# Patient Record
Sex: MALE | NOT HISPANIC OR LATINO | Employment: FULL TIME | URBAN - METROPOLITAN AREA
[De-identification: names, ages, dates, MRNs, and addresses within clinical notes are randomized per-mention and may not be internally consistent; named-entity substitution may affect disease eponyms.]

---

## 2017-04-25 ENCOUNTER — COMMUNICATION - HEALTHEAST (OUTPATIENT)
Dept: FAMILY MEDICINE | Facility: CLINIC | Age: 57
End: 2017-04-25

## 2017-04-25 DIAGNOSIS — I10 HTN (HYPERTENSION): ICD-10-CM

## 2017-04-25 DIAGNOSIS — E78.5 HYPERLIPIDEMIA: ICD-10-CM

## 2017-05-19 ENCOUNTER — OFFICE VISIT - HEALTHEAST (OUTPATIENT)
Dept: FAMILY MEDICINE | Facility: CLINIC | Age: 57
End: 2017-05-19

## 2017-05-19 DIAGNOSIS — E78.5 HYPERLIPIDEMIA: ICD-10-CM

## 2017-05-19 DIAGNOSIS — Z00.00 HEALTH CARE MAINTENANCE: ICD-10-CM

## 2017-05-19 DIAGNOSIS — I10 HYPERTENSION: ICD-10-CM

## 2017-05-19 DIAGNOSIS — E66.9 NON MORBID OBESITY, UNSPECIFIED OBESITY TYPE: ICD-10-CM

## 2017-05-19 LAB
CHOLEST SERPL-MCNC: 181 MG/DL
FASTING STATUS PATIENT QL REPORTED: YES
HDLC SERPL-MCNC: 42 MG/DL
LDLC SERPL CALC-MCNC: 108 MG/DL
PSA SERPL-MCNC: 1.7 NG/ML (ref 0–3.5)
TRIGL SERPL-MCNC: 153 MG/DL

## 2017-05-26 ENCOUNTER — COMMUNICATION - HEALTHEAST (OUTPATIENT)
Dept: FAMILY MEDICINE | Facility: CLINIC | Age: 57
End: 2017-05-26

## 2017-07-22 ENCOUNTER — COMMUNICATION - HEALTHEAST (OUTPATIENT)
Dept: FAMILY MEDICINE | Facility: CLINIC | Age: 57
End: 2017-07-22

## 2017-07-22 DIAGNOSIS — E78.5 HYPERLIPIDEMIA: ICD-10-CM

## 2017-07-22 DIAGNOSIS — I10 HTN (HYPERTENSION): ICD-10-CM

## 2017-07-25 ENCOUNTER — AMBULATORY - HEALTHEAST (OUTPATIENT)
Dept: FAMILY MEDICINE | Facility: CLINIC | Age: 57
End: 2017-07-25

## 2017-07-25 ENCOUNTER — AMBULATORY - HEALTHEAST (OUTPATIENT)
Dept: LAB | Facility: CLINIC | Age: 57
End: 2017-07-25

## 2017-07-25 DIAGNOSIS — Z00.00 HEALTH CARE MAINTENANCE: ICD-10-CM

## 2017-07-26 LAB — PSA SERPL-MCNC: 1.9 NG/ML (ref 0–3.5)

## 2017-07-28 ENCOUNTER — COMMUNICATION - HEALTHEAST (OUTPATIENT)
Dept: FAMILY MEDICINE | Facility: CLINIC | Age: 57
End: 2017-07-28

## 2017-07-28 ENCOUNTER — AMBULATORY - HEALTHEAST (OUTPATIENT)
Dept: FAMILY MEDICINE | Facility: CLINIC | Age: 57
End: 2017-07-28

## 2017-07-28 DIAGNOSIS — R97.20 ELEVATED PSA: ICD-10-CM

## 2017-08-10 ENCOUNTER — RECORDS - HEALTHEAST (OUTPATIENT)
Dept: ADMINISTRATIVE | Facility: OTHER | Age: 57
End: 2017-08-10

## 2018-01-18 ENCOUNTER — COMMUNICATION - HEALTHEAST (OUTPATIENT)
Dept: FAMILY MEDICINE | Facility: CLINIC | Age: 58
End: 2018-01-18

## 2018-01-18 DIAGNOSIS — E78.5 HYPERLIPIDEMIA: ICD-10-CM

## 2018-03-17 ENCOUNTER — OFFICE VISIT - HEALTHEAST (OUTPATIENT)
Dept: FAMILY MEDICINE | Facility: CLINIC | Age: 58
End: 2018-03-17

## 2018-03-17 DIAGNOSIS — M25.561 PAIN OF RIGHT KNEE AFTER INJURY: ICD-10-CM

## 2018-03-17 DIAGNOSIS — M76.899 ENTHESOPATHY, KNEE: ICD-10-CM

## 2018-04-12 ENCOUNTER — COMMUNICATION - HEALTHEAST (OUTPATIENT)
Dept: FAMILY MEDICINE | Facility: CLINIC | Age: 58
End: 2018-04-12

## 2018-04-12 DIAGNOSIS — I10 HTN (HYPERTENSION): ICD-10-CM

## 2018-05-03 ENCOUNTER — OFFICE VISIT - HEALTHEAST (OUTPATIENT)
Dept: FAMILY MEDICINE | Facility: CLINIC | Age: 58
End: 2018-05-03

## 2018-05-03 DIAGNOSIS — Z00.00 HEALTH CARE MAINTENANCE: ICD-10-CM

## 2018-05-03 DIAGNOSIS — E78.2 MIXED HYPERLIPIDEMIA: ICD-10-CM

## 2018-05-03 DIAGNOSIS — I10 ESSENTIAL HYPERTENSION: ICD-10-CM

## 2018-05-03 LAB
ALBUMIN SERPL-MCNC: 3.8 G/DL (ref 3.5–5)
ALP SERPL-CCNC: 55 U/L (ref 45–120)
ALT SERPL W P-5'-P-CCNC: 26 U/L (ref 0–45)
ANION GAP SERPL CALCULATED.3IONS-SCNC: 11 MMOL/L (ref 5–18)
AST SERPL W P-5'-P-CCNC: 28 U/L (ref 0–40)
BILIRUB SERPL-MCNC: 0.9 MG/DL (ref 0–1)
BUN SERPL-MCNC: 23 MG/DL (ref 8–22)
CALCIUM SERPL-MCNC: 9.4 MG/DL (ref 8.5–10.5)
CHLORIDE BLD-SCNC: 102 MMOL/L (ref 98–107)
CHOLEST SERPL-MCNC: 201 MG/DL
CO2 SERPL-SCNC: 26 MMOL/L (ref 22–31)
CREAT SERPL-MCNC: 1.36 MG/DL (ref 0.7–1.3)
ERYTHROCYTE [DISTWIDTH] IN BLOOD BY AUTOMATED COUNT: 11.6 % (ref 11–14.5)
FASTING STATUS PATIENT QL REPORTED: YES
GFR SERPL CREATININE-BSD FRML MDRD: 54 ML/MIN/1.73M2
GLUCOSE BLD-MCNC: 77 MG/DL (ref 70–125)
HCT VFR BLD AUTO: 45.7 % (ref 40–54)
HDLC SERPL-MCNC: 38 MG/DL
HGB BLD-MCNC: 15.7 G/DL (ref 14–18)
LDLC SERPL CALC-MCNC: 128 MG/DL
MCH RBC QN AUTO: 32 PG (ref 27–34)
MCHC RBC AUTO-ENTMCNC: 34.3 G/DL (ref 32–36)
MCV RBC AUTO: 93 FL (ref 80–100)
PLATELET # BLD AUTO: 209 THOU/UL (ref 140–440)
PMV BLD AUTO: 7 FL (ref 7–10)
POTASSIUM BLD-SCNC: 3 MMOL/L (ref 3.5–5)
PROT SERPL-MCNC: 7.1 G/DL (ref 6–8)
PSA SERPL-MCNC: 1.7 NG/ML (ref 0–3.5)
RBC # BLD AUTO: 4.9 MILL/UL (ref 4.4–6.2)
SODIUM SERPL-SCNC: 139 MMOL/L (ref 136–145)
TRIGL SERPL-MCNC: 174 MG/DL
WBC: 9.6 THOU/UL (ref 4–11)

## 2018-05-07 ENCOUNTER — COMMUNICATION - HEALTHEAST (OUTPATIENT)
Dept: FAMILY MEDICINE | Facility: CLINIC | Age: 58
End: 2018-05-07

## 2018-05-08 ENCOUNTER — OFFICE VISIT - HEALTHEAST (OUTPATIENT)
Dept: FAMILY MEDICINE | Facility: CLINIC | Age: 58
End: 2018-05-08

## 2018-05-08 DIAGNOSIS — E78.2 MIXED HYPERLIPIDEMIA: ICD-10-CM

## 2018-05-08 DIAGNOSIS — E87.6 HYPOKALEMIA: ICD-10-CM

## 2018-05-25 ENCOUNTER — AMBULATORY - HEALTHEAST (OUTPATIENT)
Dept: LAB | Facility: CLINIC | Age: 58
End: 2018-05-25

## 2018-05-25 DIAGNOSIS — E87.6 HYPOKALEMIA: ICD-10-CM

## 2018-05-25 LAB — POTASSIUM BLD-SCNC: 3.3 MMOL/L (ref 3.5–5)

## 2018-05-30 ENCOUNTER — COMMUNICATION - HEALTHEAST (OUTPATIENT)
Dept: FAMILY MEDICINE | Facility: CLINIC | Age: 58
End: 2018-05-30

## 2019-07-11 ENCOUNTER — OFFICE VISIT - HEALTHEAST (OUTPATIENT)
Dept: FAMILY MEDICINE | Facility: CLINIC | Age: 59
End: 2019-07-11

## 2019-07-11 DIAGNOSIS — Z00.00 HEALTH CARE MAINTENANCE: ICD-10-CM

## 2019-07-11 DIAGNOSIS — N18.30 CHRONIC KIDNEY DISEASE, STAGE III (MODERATE) (H): ICD-10-CM

## 2019-07-11 DIAGNOSIS — E87.6 HYPOKALEMIA: ICD-10-CM

## 2019-07-11 DIAGNOSIS — E78.2 MIXED HYPERLIPIDEMIA: ICD-10-CM

## 2019-07-11 DIAGNOSIS — I10 ESSENTIAL HYPERTENSION: ICD-10-CM

## 2019-07-11 DIAGNOSIS — E66.01 MORBID OBESITY (H): ICD-10-CM

## 2019-07-11 LAB
ALBUMIN SERPL-MCNC: 4.2 G/DL (ref 3.5–5)
ALP SERPL-CCNC: 57 U/L (ref 45–120)
ALT SERPL W P-5'-P-CCNC: 29 U/L (ref 0–45)
ANION GAP SERPL CALCULATED.3IONS-SCNC: 12 MMOL/L (ref 5–18)
AST SERPL W P-5'-P-CCNC: 31 U/L (ref 0–40)
BILIRUB SERPL-MCNC: 1.1 MG/DL (ref 0–1)
BUN SERPL-MCNC: 21 MG/DL (ref 8–22)
CALCIUM SERPL-MCNC: 9.6 MG/DL (ref 8.5–10.5)
CHLORIDE BLD-SCNC: 99 MMOL/L (ref 98–107)
CHOLEST SERPL-MCNC: 186 MG/DL
CO2 SERPL-SCNC: 28 MMOL/L (ref 22–31)
CREAT SERPL-MCNC: 1.56 MG/DL (ref 0.7–1.3)
ERYTHROCYTE [DISTWIDTH] IN BLOOD BY AUTOMATED COUNT: 11.7 % (ref 11–14.5)
FASTING STATUS PATIENT QL REPORTED: YES
GFR SERPL CREATININE-BSD FRML MDRD: 45 ML/MIN/1.73M2
GLUCOSE BLD-MCNC: 78 MG/DL (ref 70–125)
HCT VFR BLD AUTO: 46.8 % (ref 40–54)
HDLC SERPL-MCNC: 42 MG/DL
HGB BLD-MCNC: 15.8 G/DL (ref 14–18)
LDLC SERPL CALC-MCNC: 108 MG/DL
MCH RBC QN AUTO: 31.5 PG (ref 27–34)
MCHC RBC AUTO-ENTMCNC: 33.7 G/DL (ref 32–36)
MCV RBC AUTO: 94 FL (ref 80–100)
PLATELET # BLD AUTO: 230 THOU/UL (ref 140–440)
PMV BLD AUTO: 6.9 FL (ref 7–10)
POTASSIUM BLD-SCNC: 3.1 MMOL/L (ref 3.5–5)
PROT SERPL-MCNC: 7.2 G/DL (ref 6–8)
PSA SERPL-MCNC: 2 NG/ML (ref 0–4.5)
RBC # BLD AUTO: 5 MILL/UL (ref 4.4–6.2)
SODIUM SERPL-SCNC: 139 MMOL/L (ref 136–145)
TRIGL SERPL-MCNC: 182 MG/DL
WBC: 9.2 THOU/UL (ref 4–11)

## 2019-07-11 ASSESSMENT — MIFFLIN-ST. JEOR: SCORE: 1909.86

## 2019-07-12 ENCOUNTER — AMBULATORY - HEALTHEAST (OUTPATIENT)
Dept: FAMILY MEDICINE | Facility: CLINIC | Age: 59
End: 2019-07-12

## 2019-07-12 ENCOUNTER — COMMUNICATION - HEALTHEAST (OUTPATIENT)
Dept: FAMILY MEDICINE | Facility: CLINIC | Age: 59
End: 2019-07-12

## 2019-07-12 DIAGNOSIS — I10 ESSENTIAL HYPERTENSION: ICD-10-CM

## 2019-07-26 ENCOUNTER — COMMUNICATION - HEALTHEAST (OUTPATIENT)
Dept: SCHEDULING | Facility: CLINIC | Age: 59
End: 2019-07-26

## 2019-07-30 ENCOUNTER — AMBULATORY - HEALTHEAST (OUTPATIENT)
Dept: NURSING | Facility: CLINIC | Age: 59
End: 2019-07-30

## 2019-07-30 ENCOUNTER — COMMUNICATION - HEALTHEAST (OUTPATIENT)
Dept: FAMILY MEDICINE | Facility: CLINIC | Age: 59
End: 2019-07-30

## 2019-07-30 ENCOUNTER — AMBULATORY - HEALTHEAST (OUTPATIENT)
Dept: LAB | Facility: CLINIC | Age: 59
End: 2019-07-30

## 2019-07-30 DIAGNOSIS — I10 ESSENTIAL HYPERTENSION: ICD-10-CM

## 2019-07-30 LAB
ANION GAP SERPL CALCULATED.3IONS-SCNC: 12 MMOL/L (ref 5–18)
BUN SERPL-MCNC: 16 MG/DL (ref 8–22)
CALCIUM SERPL-MCNC: 9.5 MG/DL (ref 8.5–10.5)
CHLORIDE BLD-SCNC: 106 MMOL/L (ref 98–107)
CO2 SERPL-SCNC: 23 MMOL/L (ref 22–31)
CREAT SERPL-MCNC: 1.49 MG/DL (ref 0.7–1.3)
GFR SERPL CREATININE-BSD FRML MDRD: 48 ML/MIN/1.73M2
GLUCOSE BLD-MCNC: 72 MG/DL (ref 70–125)
POTASSIUM BLD-SCNC: 4 MMOL/L (ref 3.5–5)
SODIUM SERPL-SCNC: 141 MMOL/L (ref 136–145)

## 2019-08-01 ENCOUNTER — COMMUNICATION - HEALTHEAST (OUTPATIENT)
Dept: FAMILY MEDICINE | Facility: CLINIC | Age: 59
End: 2019-08-01

## 2019-11-21 ENCOUNTER — COMMUNICATION - HEALTHEAST (OUTPATIENT)
Dept: FAMILY MEDICINE | Facility: CLINIC | Age: 59
End: 2019-11-21

## 2020-01-14 ENCOUNTER — COMMUNICATION - HEALTHEAST (OUTPATIENT)
Dept: FAMILY MEDICINE | Facility: CLINIC | Age: 60
End: 2020-01-14

## 2020-03-03 ENCOUNTER — COMMUNICATION - HEALTHEAST (OUTPATIENT)
Dept: FAMILY MEDICINE | Facility: CLINIC | Age: 60
End: 2020-03-03

## 2020-03-03 DIAGNOSIS — I10 ESSENTIAL HYPERTENSION: ICD-10-CM

## 2020-06-19 ENCOUNTER — COMMUNICATION - HEALTHEAST (OUTPATIENT)
Dept: FAMILY MEDICINE | Facility: CLINIC | Age: 60
End: 2020-06-19

## 2020-06-19 DIAGNOSIS — I10 ESSENTIAL HYPERTENSION: ICD-10-CM

## 2020-06-22 ENCOUNTER — COMMUNICATION - HEALTHEAST (OUTPATIENT)
Dept: FAMILY MEDICINE | Facility: CLINIC | Age: 60
End: 2020-06-22

## 2020-06-22 DIAGNOSIS — E78.2 MIXED HYPERLIPIDEMIA: ICD-10-CM

## 2020-06-22 DIAGNOSIS — I10 ESSENTIAL HYPERTENSION: ICD-10-CM

## 2020-09-18 ENCOUNTER — COMMUNICATION - HEALTHEAST (OUTPATIENT)
Dept: FAMILY MEDICINE | Facility: CLINIC | Age: 60
End: 2020-09-18

## 2020-09-18 DIAGNOSIS — E78.2 MIXED HYPERLIPIDEMIA: ICD-10-CM

## 2020-09-18 DIAGNOSIS — I10 ESSENTIAL HYPERTENSION: ICD-10-CM

## 2020-11-20 ENCOUNTER — OFFICE VISIT - HEALTHEAST (OUTPATIENT)
Dept: FAMILY MEDICINE | Facility: CLINIC | Age: 60
End: 2020-11-20

## 2020-11-20 DIAGNOSIS — I10 ESSENTIAL HYPERTENSION: ICD-10-CM

## 2020-11-20 DIAGNOSIS — Z79.899 MEDICATION MANAGEMENT: ICD-10-CM

## 2020-11-20 DIAGNOSIS — Z12.5 SCREENING FOR PROSTATE CANCER: ICD-10-CM

## 2020-11-20 DIAGNOSIS — E78.2 MIXED HYPERLIPIDEMIA: ICD-10-CM

## 2020-11-20 DIAGNOSIS — Z23 IMMUNIZATION DUE: ICD-10-CM

## 2020-11-20 LAB
ALBUMIN SERPL-MCNC: 4.4 G/DL (ref 3.5–5)
ALP SERPL-CCNC: 64 U/L (ref 45–120)
ALT SERPL W P-5'-P-CCNC: 24 U/L (ref 0–45)
ANION GAP SERPL CALCULATED.3IONS-SCNC: 10 MMOL/L (ref 5–18)
AST SERPL W P-5'-P-CCNC: 28 U/L (ref 0–40)
BILIRUB SERPL-MCNC: 1.2 MG/DL (ref 0–1)
BUN SERPL-MCNC: 20 MG/DL (ref 8–22)
CALCIUM SERPL-MCNC: 9.3 MG/DL (ref 8.5–10.5)
CHLORIDE BLD-SCNC: 105 MMOL/L (ref 98–107)
CHOLEST SERPL-MCNC: 189 MG/DL
CO2 SERPL-SCNC: 26 MMOL/L (ref 22–31)
CREAT SERPL-MCNC: 1.36 MG/DL (ref 0.7–1.3)
ERYTHROCYTE [DISTWIDTH] IN BLOOD BY AUTOMATED COUNT: 12 % (ref 11–14.5)
FASTING STATUS PATIENT QL REPORTED: YES
GFR SERPL CREATININE-BSD FRML MDRD: 53 ML/MIN/1.73M2
GLUCOSE BLD-MCNC: 76 MG/DL (ref 70–125)
HCT VFR BLD AUTO: 46.3 % (ref 40–54)
HDLC SERPL-MCNC: 45 MG/DL
HGB BLD-MCNC: 15.5 G/DL (ref 14–18)
LDLC SERPL CALC-MCNC: 119 MG/DL
MCH RBC QN AUTO: 30.9 PG (ref 27–34)
MCHC RBC AUTO-ENTMCNC: 33.5 G/DL (ref 32–36)
MCV RBC AUTO: 92 FL (ref 80–100)
PLATELET # BLD AUTO: 162 THOU/UL (ref 140–440)
PMV BLD AUTO: 6.8 FL (ref 7–10)
POTASSIUM BLD-SCNC: 3.6 MMOL/L (ref 3.5–5)
PROT SERPL-MCNC: 7.3 G/DL (ref 6–8)
PSA SERPL-MCNC: 2.1 NG/ML (ref 0–4.5)
RBC # BLD AUTO: 5.01 MILL/UL (ref 4.4–6.2)
SODIUM SERPL-SCNC: 141 MMOL/L (ref 136–145)
TRIGL SERPL-MCNC: 123 MG/DL
WBC: 8.3 THOU/UL (ref 4–11)

## 2020-11-20 RX ORDER — SIMVASTATIN 20 MG
20 TABLET ORAL AT BEDTIME
Qty: 90 TABLET | Refills: 3 | Status: SHIPPED | OUTPATIENT
Start: 2020-11-20 | End: 2021-11-19

## 2020-11-20 RX ORDER — LOSARTAN POTASSIUM 50 MG/1
50 TABLET ORAL DAILY
Qty: 90 TABLET | Refills: 3 | Status: SHIPPED | OUTPATIENT
Start: 2020-11-20 | End: 2021-11-19

## 2020-11-20 RX ORDER — LOSARTAN POTASSIUM 100 MG/1
100 TABLET ORAL DAILY
Qty: 90 TABLET | Refills: 3 | Status: SHIPPED | OUTPATIENT
Start: 2020-11-20 | End: 2021-11-19

## 2020-11-20 RX ORDER — AMLODIPINE BESYLATE 10 MG/1
10 TABLET ORAL DAILY
Qty: 90 TABLET | Refills: 3 | Status: SHIPPED | OUTPATIENT
Start: 2020-11-20 | End: 2021-11-19

## 2020-11-20 RX ORDER — ATENOLOL 100 MG/1
100 TABLET ORAL DAILY
Qty: 90 TABLET | Refills: 3 | Status: SHIPPED | OUTPATIENT
Start: 2020-11-20 | End: 2021-11-19

## 2020-11-20 ASSESSMENT — MIFFLIN-ST. JEOR: SCORE: 1815.51

## 2020-11-21 ENCOUNTER — COMMUNICATION - HEALTHEAST (OUTPATIENT)
Dept: FAMILY MEDICINE | Facility: CLINIC | Age: 60
End: 2020-11-21

## 2020-12-01 ENCOUNTER — COMMUNICATION - HEALTHEAST (OUTPATIENT)
Dept: FAMILY MEDICINE | Facility: CLINIC | Age: 60
End: 2020-12-01

## 2020-12-01 DIAGNOSIS — I10 ESSENTIAL HYPERTENSION: ICD-10-CM

## 2020-12-02 RX ORDER — CLONIDINE HYDROCHLORIDE 0.2 MG/1
0.2 TABLET ORAL 2 TIMES DAILY
Qty: 180 TABLET | Refills: 1 | Status: SHIPPED | OUTPATIENT
Start: 2020-12-02 | End: 2021-11-19

## 2021-02-17 ENCOUNTER — OFFICE VISIT - HEALTHEAST (OUTPATIENT)
Dept: FAMILY MEDICINE | Facility: CLINIC | Age: 61
End: 2021-02-17

## 2021-02-17 DIAGNOSIS — Z00.00 HEALTH CARE MAINTENANCE: ICD-10-CM

## 2021-02-17 DIAGNOSIS — Z12.11 SCREEN FOR COLON CANCER: ICD-10-CM

## 2021-02-17 DIAGNOSIS — E78.2 MIXED HYPERLIPIDEMIA: ICD-10-CM

## 2021-02-17 DIAGNOSIS — I10 ESSENTIAL HYPERTENSION: ICD-10-CM

## 2021-02-17 ASSESSMENT — MIFFLIN-ST. JEOR: SCORE: 1799.18

## 2021-03-01 ENCOUNTER — AMBULATORY - HEALTHEAST (OUTPATIENT)
Dept: NURSING | Facility: CLINIC | Age: 61
End: 2021-03-01

## 2021-03-01 ENCOUNTER — AMBULATORY - HEALTHEAST (OUTPATIENT)
Dept: LAB | Facility: CLINIC | Age: 61
End: 2021-03-01

## 2021-03-01 DIAGNOSIS — I10 ESSENTIAL HYPERTENSION: ICD-10-CM

## 2021-03-01 LAB
ALBUMIN SERPL-MCNC: 4.3 G/DL (ref 3.5–5)
ALP SERPL-CCNC: 66 U/L (ref 45–120)
ALT SERPL W P-5'-P-CCNC: 27 U/L (ref 0–45)
ANION GAP SERPL CALCULATED.3IONS-SCNC: 9 MMOL/L (ref 5–18)
AST SERPL W P-5'-P-CCNC: 32 U/L (ref 0–40)
BILIRUB SERPL-MCNC: 1.2 MG/DL (ref 0–1)
BUN SERPL-MCNC: 18 MG/DL (ref 8–22)
CALCIUM SERPL-MCNC: 9.2 MG/DL (ref 8.5–10.5)
CHLORIDE BLD-SCNC: 97 MMOL/L (ref 98–107)
CO2 SERPL-SCNC: 32 MMOL/L (ref 22–31)
CREAT SERPL-MCNC: 1.63 MG/DL (ref 0.7–1.3)
GFR SERPL CREATININE-BSD FRML MDRD: 43 ML/MIN/1.73M2
GLUCOSE BLD-MCNC: 87 MG/DL (ref 70–125)
POTASSIUM BLD-SCNC: 3.8 MMOL/L (ref 3.5–5)
PROT SERPL-MCNC: 6.9 G/DL (ref 6–8)
SODIUM SERPL-SCNC: 138 MMOL/L (ref 136–145)

## 2021-03-02 ENCOUNTER — COMMUNICATION - HEALTHEAST (OUTPATIENT)
Dept: FAMILY MEDICINE | Facility: CLINIC | Age: 61
End: 2021-03-02

## 2021-03-04 ENCOUNTER — COMMUNICATION - HEALTHEAST (OUTPATIENT)
Dept: FAMILY MEDICINE | Facility: CLINIC | Age: 61
End: 2021-03-04

## 2021-03-04 DIAGNOSIS — I10 ESSENTIAL HYPERTENSION: ICD-10-CM

## 2021-03-05 ENCOUNTER — TRANSFERRED RECORDS (OUTPATIENT)
Dept: HEALTH INFORMATION MANAGEMENT | Facility: CLINIC | Age: 61
End: 2021-03-05
Payer: COMMERCIAL

## 2021-03-05 LAB — COLOGUARD-ABSTRACT: NEGATIVE

## 2021-03-09 ENCOUNTER — COMMUNICATION - HEALTHEAST (OUTPATIENT)
Dept: LAB | Facility: CLINIC | Age: 61
End: 2021-03-09

## 2021-03-09 DIAGNOSIS — N28.9 RENAL INSUFFICIENCY: ICD-10-CM

## 2021-03-15 ENCOUNTER — AMBULATORY - HEALTHEAST (OUTPATIENT)
Dept: NURSING | Facility: CLINIC | Age: 61
End: 2021-03-15

## 2021-03-15 ENCOUNTER — AMBULATORY - HEALTHEAST (OUTPATIENT)
Dept: LAB | Facility: CLINIC | Age: 61
End: 2021-03-15

## 2021-03-15 DIAGNOSIS — I10 ESSENTIAL HYPERTENSION: ICD-10-CM

## 2021-03-15 DIAGNOSIS — N28.9 RENAL INSUFFICIENCY: ICD-10-CM

## 2021-03-15 LAB
ANION GAP SERPL CALCULATED.3IONS-SCNC: 12 MMOL/L (ref 5–18)
BUN SERPL-MCNC: 17 MG/DL (ref 8–22)
CALCIUM SERPL-MCNC: 8.9 MG/DL (ref 8.5–10.5)
CHLORIDE BLD-SCNC: 104 MMOL/L (ref 98–107)
CO2 SERPL-SCNC: 25 MMOL/L (ref 22–31)
CREAT SERPL-MCNC: 1.42 MG/DL (ref 0.7–1.3)
GFR SERPL CREATININE-BSD FRML MDRD: 51 ML/MIN/1.73M2
GLUCOSE BLD-MCNC: 84 MG/DL (ref 70–125)
POTASSIUM BLD-SCNC: 3.9 MMOL/L (ref 3.5–5)
SODIUM SERPL-SCNC: 141 MMOL/L (ref 136–145)

## 2021-03-16 ENCOUNTER — AMBULATORY - HEALTHEAST (OUTPATIENT)
Dept: FAMILY MEDICINE | Facility: CLINIC | Age: 61
End: 2021-03-16

## 2021-03-16 ENCOUNTER — COMMUNICATION - HEALTHEAST (OUTPATIENT)
Dept: FAMILY MEDICINE | Facility: CLINIC | Age: 61
End: 2021-03-16

## 2021-03-16 DIAGNOSIS — I10 ESSENTIAL HYPERTENSION: ICD-10-CM

## 2021-03-26 ENCOUNTER — COMMUNICATION - HEALTHEAST (OUTPATIENT)
Dept: FAMILY MEDICINE | Facility: CLINIC | Age: 61
End: 2021-03-26

## 2021-03-26 ENCOUNTER — AMBULATORY - HEALTHEAST (OUTPATIENT)
Dept: NURSING | Facility: CLINIC | Age: 61
End: 2021-03-26

## 2021-03-26 DIAGNOSIS — I10 ESSENTIAL HYPERTENSION: ICD-10-CM

## 2021-05-14 ENCOUNTER — RECORDS - HEALTHEAST (OUTPATIENT)
Dept: ADMINISTRATIVE | Facility: OTHER | Age: 61
End: 2021-05-14

## 2021-05-14 DIAGNOSIS — I10 ESSENTIAL HYPERTENSION: ICD-10-CM

## 2021-05-14 RX ORDER — HYDRALAZINE HYDROCHLORIDE 25 MG/1
25 TABLET, FILM COATED ORAL
Qty: 90 TABLET | Refills: 3 | Status: SHIPPED | OUTPATIENT
Start: 2021-05-14 | End: 2021-11-19

## 2021-05-27 VITALS — DIASTOLIC BLOOD PRESSURE: 78 MMHG | HEART RATE: 61 BPM | SYSTOLIC BLOOD PRESSURE: 136 MMHG

## 2021-05-27 VITALS — SYSTOLIC BLOOD PRESSURE: 157 MMHG | DIASTOLIC BLOOD PRESSURE: 88 MMHG | HEART RATE: 68 BPM

## 2021-05-27 VITALS — RESPIRATION RATE: 16 BRPM | SYSTOLIC BLOOD PRESSURE: 126 MMHG | HEART RATE: 59 BPM | DIASTOLIC BLOOD PRESSURE: 80 MMHG

## 2021-05-30 NOTE — TELEPHONE ENCOUNTER
Who is calling:  Patient  Reason for Call:  Patient is calling back in regards to the below message from the provider. Patient states he has never been prescribed Chlorthalidone. Also questioning why there are 8 prescriptions waiting at the pharmacy for him. Patient is requesting a return phone call to discuss in greater detail.  Date of last appointment with primary care: 07/11/2019  Okay to leave a detailed message: Yes

## 2021-05-30 NOTE — TELEPHONE ENCOUNTER
Relayed message below from provider. Patient states that he will call back later to schedule his nurse only bp check and lab appt.

## 2021-05-30 NOTE — TELEPHONE ENCOUNTER
Who is calling:  The patient   Reason for Call:  The patient states that he does take Chlorthalidone. The patient also states that someone did call him on Friday morning just before 8:00 am.   Date of last appointment with primary care: 7/11/2019  Okay to leave a detailed message: Yes

## 2021-05-30 NOTE — TELEPHONE ENCOUNTER
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    Ankles swollen last night.  Went away overnight.    Has been tired since starting new pill.    No shortness of breath.  Some muscle soreness at times.  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    If ankle swelling returns or goes up to kneeand does not go away overnight, needs to be seen.    Due now for recheck lab only visit and BP nurse check.    Transferred to scheduling for an appointment.    Marge Walker, RN, [facility] Nurse Triage/Med Refills RN         Reason for Disposition    [1] MODERATE pain (e.g., interferes with normal activities, limping) AND [2] present > 3 days    Protocols used: ANKLE SWELLING-A-AH

## 2021-05-30 NOTE — TELEPHONE ENCOUNTER
----- Message from Quirino Laureano MD sent at 7/12/2019  7:12 AM CDT -----  Please inform patient that liver enzymes are normal.  Kidney function is slightly decreased as it is been in the past few years and potassium level remains low.  I would recommend discontinuing his chlorthalidone and starting a new medication called clonidine.  This will help improve the potassium levels and hopefully improve kidney function.  If he is in agreement I will send to new prescriptions to the pharmacy 1 for clonidine and one for atenolol.  Cholesterol levels are at goal range.  They have improved from last year.  PSA level is stable.  No other concerns on blood work.

## 2021-05-30 NOTE — PROGRESS NOTES
Blood pressure at goal range.  With the change of the medications I suspect some of the fatigue is from clonidine- and this is something that can get better so if it's not overwhelming I would like him to continue to use for a few weeks.  The water pill was stopped due to the severe hypokalemia- in hopes to improve this but likely causing the swelling- is the swelling painful or causing symptoms?

## 2021-05-30 NOTE — TELEPHONE ENCOUNTER
Left message to call back for: results  Information to relay to patient:  LMTCB #1, Please relay message below from     Letter mailed.

## 2021-05-30 NOTE — TELEPHONE ENCOUNTER
New medications sent to the pharmacy- pt should recheck blood pressure as well as potassium and kidney function in 2 weeks.

## 2021-05-30 NOTE — TELEPHONE ENCOUNTER
Patient Returning Call  Reason for call:  Patient returning call  Information relayed to patient:  Message below from Quirino Laureano MD regarding lab results and the recommendation to discontinue Chlorthalidone and start Clonidine relayed to the patient.    Patient is in agreement to make the change with his medication.    Patient requests the Rx's for Clonidine and Atenolol be sent to:  [facility] [place] ([address] [facility])  Patient has additional questions:  No  If YES, what are your questions/concerns:  N/a  Okay to leave a detailed message?: No call back needed

## 2021-05-30 NOTE — PROGRESS NOTES
Follow Up Blood Pressure Check    Josh Mark is a 59 y.o. male recommended to follow up for blood pressure check by Quirino Laureano MD. Anihypertensive medications and adherence were verified: Yes.     Reason for visit: Medication check     Medication change at last visit:  Atenolol-chlorthalidone 100-25 mg :changed to Atenolol 100 mg daily, Clonidine 0.1 mg two times a day     Today's Vitals:   Vitals:    07/30/19 1552 07/30/19 1555   BP: 134/82 132/80   Patient Site: Right Arm Right Arm   Patient Position: Sitting Sitting   Cuff Size: Adult Large Adult Large   Pulse: 63 63       Home blood pressure readings brought in today:   No     Lowest blood pressure today is less than 140/90 and they are experiencing signs or symptoms of new onset: Swelling in feet at the end of the day. and fatigue.  Please inform patient of his/her blood pressure today.  If they are asymptomatic, the patient is to continue current medications.  This message will be routed to their provider, and they will be notified if a change in medication is recommended.      Rocío Brizuela    Current Outpatient Medications   Medication Sig Dispense Refill     amLODIPine (NORVASC) 10 MG tablet Take 1 tablet (10 mg total) by mouth daily. 90 tablet 3     aspirin 81 MG EC tablet Take 81 mg by mouth daily.       atenolol (TENORMIN) 100 MG tablet Take 1 tablet (100 mg total) by mouth daily. 90 tablet 3     cloNIDine HCl (CATAPRES) 0.1 MG tablet Take 1 tablet (0.1 mg total) by mouth 2 (two) times a day. 60 tablet 5     losartan (COZAAR) 100 MG tablet Take 1 tablet (100 mg total) by mouth daily. 90 tablet 3     losartan (COZAAR) 50 MG tablet Take 1 tablet (50 mg total) by mouth daily. 90 tablet 3     MULTIVITAMIN (MULTIPLE VITAMIN ORAL) Take by mouth.       simvastatin (ZOCOR) 20 MG tablet Take 1 tablet (20 mg total) by mouth at bedtime. 90 tablet 3     No current facility-administered medications for this visit.

## 2021-05-30 NOTE — TELEPHONE ENCOUNTER
Went through patient medications and what ones we just sent over. Patient states understanding and will only  the 6 medication from the pharmacy.

## 2021-05-30 NOTE — TELEPHONE ENCOUNTER
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 He states he is not having any discomfort in the feet, no shortness of breath. He will continue with current medications and give an update in a few weeks on how he is feeling.

## 2021-05-30 NOTE — TELEPHONE ENCOUNTER
----- Message from Quirino Laureano MD sent at 7/30/2019  4:02 PM CDT -----    Blood pressure at goal range.  With the change of the medications I suspect some of the fatigue is from clonidine- and this is something that can get better so if it's not overwhelming I would like him to continue to use for a few weeks.  The water pill was stopped due to the severe hypokalemia- in hopes to improve this but likely causing the swelling- is the swelling painful or causing symptoms?    ----- Message -----  From: Rocío Brizuela CMA  Sent: 7/30/2019   3:56 PM  To: Quirino Laureano MD

## 2021-05-30 NOTE — PROGRESS NOTES
ASSESSMENT/PLAN  1. Essential hypertension  Blood pressure at goal range  Discussed with patient the necessity for monitoring electrolytes and kidney function occasions  Is a history of hypokalemia which he did not follow-up upon with last year  We will continue with current medications at this time  If potassium returns low again we will discuss with him starting a potassium supplement versus discontinuing chlorthalidone and starting alternative medication such as clonidine  - amLODIPine (NORVASC) 10 MG tablet; Take 1 tablet (10 mg total) by mouth daily.  Dispense: 90 tablet; Refill: 3  - atenolol-chlorthalidone (TENORETIC) 100-25 mg per tablet; Take 1 tablet by mouth daily.  Dispense: 90 tablet; Refill: 3  - losartan (COZAAR) 100 MG tablet; Take 1 tablet (100 mg total) by mouth daily.  Dispense: 90 tablet; Refill: 3  - losartan (COZAAR) 50 MG tablet; Take 1 tablet (50 mg total) by mouth daily.  Dispense: 90 tablet; Refill: 3    2. Mixed hyperlipidemia  Patient is on statin therapy we will send refills to the pharmacy  Check cholesterol levels today  Discussed diet and exercise  - simvastatin (ZOCOR) 20 MG tablet; Take 1 tablet (20 mg total) by mouth at bedtime.  Dispense: 90 tablet; Refill: 3    3. Health care maintenance  Patient is due for fasting blood work which will obtain today  Discussed screening for colon cancer which he declined  Discussed his previous colonoscopy with a dentist polyp and we encouraged him to pursue a colonoscopy  Patient states that he will consider  - Comprehensive Metabolic Panel  - HM2(CBC w/o Differential)  - Lipid Cascade  - PSA (Prostatic-Specific Antigen), Annual Screen    4. Morbid obesity (H)  Patient is aware of his weight and the need for weight loss    5. Chronic kidney disease, stage III (moderate) (H)  Patient has had signs of kidney disease we discussed the importance of regular monitoring  We will check creatinine levels today  If creatinine levels worsening we will have  to consider changing alternative medications which would not affect the kidneys as much such as removing chlorthalidone    6. Hypokalemia  Check potassium levels today and follow-up based on results        SUBJECTIVE:   Chief Complaint   Patient presents with     Hypertension     Medication check     Hyperlipidemia     Medication check - fasting labs      Josh LEXUS Deedee 59 y.o. male    Current Outpatient Medications   Medication Sig Dispense Refill     amLODIPine (NORVASC) 10 MG tablet Take 1 tablet (10 mg total) by mouth daily. 90 tablet 3     aspirin 81 MG EC tablet Take 81 mg by mouth daily.       atenolol-chlorthalidone (TENORETIC) 100-25 mg per tablet Take 1 tablet by mouth daily. 90 tablet 3     losartan (COZAAR) 100 MG tablet Take 1 tablet (100 mg total) by mouth daily. 90 tablet 3     losartan (COZAAR) 50 MG tablet Take 1 tablet (50 mg total) by mouth daily. 90 tablet 3     MULTIVITAMIN (MULTIPLE VITAMIN ORAL) Take by mouth.       simvastatin (ZOCOR) 20 MG tablet Take 1 tablet (20 mg total) by mouth at bedtime. 90 tablet 3     No current facility-administered medications for this visit.      Allergies: Patient has no known allergies.   No LMP for male patient.    HPI:   Patient is here for medication check.  Patient has not been seen for greater than a year and due for refills and was encouraged to follow-up  Patient has a long-standing history of needing multiple blood pressure medications for blood pressure control but unfortunate is also had problems with hypokalemia  Patient is asymptomatic hypokalemia but we discussed the importance of trying to keep normal potassium levels  Has had some renal insufficiency and he is aware of the need of avoiding medications that will exacerbate this and the necessity of controlling blood pressure to not further hasten kidney function  Discussed with him today checking kidney function and potassium levels and if not at goal range consider discontinuing chlorthalidone  "and replacing it with possibly clonidine  We discussed diet and exercise    Patient was on a statin therapy we will check cholesterol levels today  Patient has been working a lot and is not been exercising    Patient is overdue for colon cancer screening- we discussed options today due to his previous polyp that was identified as higher risk he should pursue colonoscopy-he declined at this time but will consider    ROS: negative except as per HPI    OBJECTIVE:   The patient appears well, alert, oriented x 3, in no distress.  /78 (Patient Site: Left Arm, Patient Position: Sitting, Cuff Size: Adult Large)   Pulse 70   Temp 97.5  F (36.4  C) (Oral)   Resp 16   Ht 5' 7\" (1.702 m)   Wt (!) 253 lb 12.8 oz (115.1 kg)   BMI 39.75 kg/m      Lungs: clear, good air entry, no wheezes, rhonchi or rales.   Cardiac: S1 and S2 normal, no murmurs, regular rate and rhythm.   Abdomen: normal bowel sounds, BMI 39  Extremities: show no edema, normal peripheral pulses.   Neurological: normal, no focal findings.  Skin: clear, dry, no rashes/lesions  Psych- normal mood and affect      Pt states an understanding and agrees to the above plan.  Greater than 25 minutes was spent today in interview and examination with Josh Mark with more than 50% of that time in counseling and coordination of care.    "

## 2021-05-31 VITALS — BODY MASS INDEX: 39.19 KG/M2 | WEIGHT: 254 LBS

## 2021-05-31 NOTE — TELEPHONE ENCOUNTER
Patient Returning Call  Reason for call:  Patient called back.  Information relayed to patient:  Informed of Dr Laureano's message listed below.  Patient states understanding and agrees with plan.  Patient has additional questions:  No  If YES, what are your questions/concerns:    Okay to leave a detailed message?: No call back needed

## 2021-05-31 NOTE — TELEPHONE ENCOUNTER
----- Message from Quirino Laureano MD sent at 7/31/2019  4:34 PM CDT -----  Potassium levels have normalized.  Kidney function has slightly improved with the change of medications.  Continue to keep well-hydrated.  Recommend continued use of the current medications.

## 2021-05-31 NOTE — TELEPHONE ENCOUNTER
Left message to call back for: Results- letter sent  Information to relay to patient:  MD's message below

## 2021-06-01 VITALS — BODY MASS INDEX: 38.32 KG/M2 | WEIGHT: 248.31 LBS

## 2021-06-01 VITALS — WEIGHT: 249 LBS | BODY MASS INDEX: 38.42 KG/M2

## 2021-06-01 VITALS — BODY MASS INDEX: 38.5 KG/M2 | WEIGHT: 249.5 LBS

## 2021-06-03 VITALS — HEIGHT: 67 IN | BODY MASS INDEX: 39.83 KG/M2 | WEIGHT: 253.8 LBS

## 2021-06-05 VITALS
TEMPERATURE: 98.1 F | HEIGHT: 67 IN | SYSTOLIC BLOOD PRESSURE: 157 MMHG | DIASTOLIC BLOOD PRESSURE: 93 MMHG | BODY MASS INDEX: 36 KG/M2 | RESPIRATION RATE: 16 BRPM | WEIGHT: 229.4 LBS | HEART RATE: 57 BPM

## 2021-06-05 VITALS
WEIGHT: 233 LBS | SYSTOLIC BLOOD PRESSURE: 161 MMHG | HEART RATE: 60 BPM | BODY MASS INDEX: 36.57 KG/M2 | HEIGHT: 67 IN | DIASTOLIC BLOOD PRESSURE: 92 MMHG

## 2021-06-09 NOTE — TELEPHONE ENCOUNTER
Refill Approved    Rx renewed per Medication Renewal Policy. Medication was last renewed on 7/11/19.    Hiral Berry, [facility] Triage/Med Refill 6/19/2020     Requested Prescriptions   Pending Prescriptions Disp Refills     losartan (COZAAR) 50 MG tablet [Pharmacy Med Name: Losartan Potassium Oral Tablet 50 MG] 90 tablet 0     Sig: Take 1 tablet (50 mg total) by mouth daily.       Angiotensin Receptor Blocker Protocol Passed - 6/19/2020  3:21 AM        Passed - PCP or prescribing provider visit in past 12 months       Last office visit with prescriber/PCP: 7/11/2019 Quirino Laureano MD OR same dept: 7/11/2019 Quirino Laureano MD OR same specialty: 7/11/2019 Quirino Laureano MD  Last physical: Visit date not found Last MTM visit: Visit date not found   Next visit within 3 mo: Visit date not found  Next physical within 3 mo: Visit date not found  Prescriber OR PCP: Quirino Laureano MD  Last diagnosis associated with med order: 1. Essential hypertension  - losartan (COZAAR) 50 MG tablet [Pharmacy Med Name: Losartan Potassium Oral Tablet 50 MG]; Take 1 tablet (50 mg total) by mouth daily.  Dispense: 90 tablet; Refill: 0  - losartan (COZAAR) 100 MG tablet [Pharmacy Med Name: Losartan Potassium Oral Tablet 100 MG]; Take 1 tablet (100 mg total) by mouth daily.  Dispense: 90 tablet; Refill: 0    If protocol passes may refill for 12 months if within 3 months of last provider visit (or a total of 15 months).             Passed - Serum potassium within the past 12 months     Lab Results   Component Value Date    Potassium 4.0 07/30/2019             Passed - Blood pressure filed in past 12 months     BP Readings from Last 1 Encounters:   07/30/19 132/80             Passed - Serum creatinine within the past 12 months     Creatinine   Date Value Ref Range Status   07/30/2019 1.49 (H) 0.70 - 1.30 mg/dL Final                losartan (COZAAR) 100 MG tablet [Pharmacy Med Name: Losartan Potassium Oral  Tablet 100 MG] 90 tablet 0     Sig: Take 1 tablet (100 mg total) by mouth daily.       Angiotensin Receptor Blocker Protocol Passed - 6/19/2020  3:21 AM        Passed - PCP or prescribing provider visit in past 12 months       Last office visit with prescriber/PCP: 7/11/2019 Quirino Laureano MD OR same dept: 7/11/2019 Quirino Laureano MD OR same specialty: 7/11/2019 Quirino Laureano MD  Last physical: Visit date not found Last MTM visit: Visit date not found   Next visit within 3 mo: Visit date not found  Next physical within 3 mo: Visit date not found  Prescriber OR PCP: Quirino Laureano MD  Last diagnosis associated with med order: 1. Essential hypertension  - losartan (COZAAR) 50 MG tablet [Pharmacy Med Name: Losartan Potassium Oral Tablet 50 MG]; Take 1 tablet (50 mg total) by mouth daily.  Dispense: 90 tablet; Refill: 0  - losartan (COZAAR) 100 MG tablet [Pharmacy Med Name: Losartan Potassium Oral Tablet 100 MG]; Take 1 tablet (100 mg total) by mouth daily.  Dispense: 90 tablet; Refill: 0    If protocol passes may refill for 12 months if within 3 months of last provider visit (or a total of 15 months).             Passed - Serum potassium within the past 12 months     Lab Results   Component Value Date    Potassium 4.0 07/30/2019             Passed - Blood pressure filed in past 12 months     BP Readings from Last 1 Encounters:   07/30/19 132/80             Passed - Serum creatinine within the past 12 months     Creatinine   Date Value Ref Range Status   07/30/2019 1.49 (H) 0.70 - 1.30 mg/dL Final

## 2021-06-09 NOTE — TELEPHONE ENCOUNTER
Refills Approved x 5    Rx renewed per Medication Renewal Policy. Medication was last renewed on 07/11/2019-Zocor and amlodipine, 06/19/2020-Cozaar, 07/12/2019-atenolol.  Last office visit was 07/11/2019 with PCP.    Nisha Lee, [facility] Triage/Med Refill 6/22/2020     Requested Prescriptions   Pending Prescriptions Disp Refills     amLODIPine (NORVASC) 10 MG tablet [Pharmacy Med Name: amLODIPine Besylate Oral Tablet 10 MG] 90 tablet 0     Sig: Take 1 tablet (10 mg total) by mouth daily.       Calcium-Channel Blockers Protocol Passed - 6/22/2020  3:03 AM        Passed - PCP or prescribing provider visit in past 12 months or next 3 months     Last office visit with prescriber/PCP: 7/11/2019 Quirino Laureano MD OR same dept: 7/11/2019 Quirino Laureano MD OR same specialty: 7/11/2019 Quirino Laureano MD  Last physical: Visit date not found Last MTM visit: Visit date not found   Next visit within 3 mo: Visit date not found  Next physical within 3 mo: Visit date not found  Prescriber OR PCP: Quirino Laureano MD  Last diagnosis associated with med order: 1. Essential hypertension  - amLODIPine (NORVASC) 10 MG tablet [Pharmacy Med Name: amLODIPine Besylate Oral Tablet 10 MG]; Take 1 tablet (10 mg total) by mouth daily.  Dispense: 90 tablet; Refill: 0  - losartan (COZAAR) 100 MG tablet [Pharmacy Med Name: Losartan Potassium Oral Tablet 100 MG]; Take 1 tablet (100 mg total) by mouth daily.  Dispense: 90 tablet; Refill: 0  - losartan (COZAAR) 50 MG tablet [Pharmacy Med Name: Losartan Potassium Oral Tablet 50 MG]; Take 1 tablet (50 mg total) by mouth daily.  Dispense: 90 tablet; Refill: 0  - atenoloL (TENORMIN) 100 MG tablet [Pharmacy Med Name: Atenolol Oral Tablet 100 MG]; TAKE ONE TABLET BY MOUTH ONCE DAILY   Dispense: 90 tablet; Refill: 0    2. Mixed hyperlipidemia  - simvastatin (ZOCOR) 20 MG tablet [Pharmacy Med Name: Simvastatin Oral Tablet 20 MG]; Take 1 tablet (20 mg total) by mouth  at bedtime.  Dispense: 90 tablet; Refill: 0    If protocol passes may refill for 12 months if within 3 months of last provider visit (or a total of 15 months).             Passed - Blood pressure filed in past 12 months     BP Readings from Last 1 Encounters:   07/30/19 132/80                losartan (COZAAR) 100 MG tablet [Pharmacy Med Name: Losartan Potassium Oral Tablet 100 MG] 90 tablet 0     Sig: Take 1 tablet (100 mg total) by mouth daily.       Angiotensin Receptor Blocker Protocol Passed - 6/22/2020  3:03 AM        Passed - PCP or prescribing provider visit in past 12 months       Last office visit with prescriber/PCP: 7/11/2019 Quirino Laureano MD OR same dept: 7/11/2019 Quirino Laureano MD OR same specialty: 7/11/2019 Quirino Laureano MD  Last physical: Visit date not found Last MTM visit: Visit date not found   Next visit within 3 mo: Visit date not found  Next physical within 3 mo: Visit date not found  Prescriber OR PCP: Quirino Laureano MD  Last diagnosis associated with med order: 1. Essential hypertension  - amLODIPine (NORVASC) 10 MG tablet [Pharmacy Med Name: amLODIPine Besylate Oral Tablet 10 MG]; Take 1 tablet (10 mg total) by mouth daily.  Dispense: 90 tablet; Refill: 0  - losartan (COZAAR) 100 MG tablet [Pharmacy Med Name: Losartan Potassium Oral Tablet 100 MG]; Take 1 tablet (100 mg total) by mouth daily.  Dispense: 90 tablet; Refill: 0  - losartan (COZAAR) 50 MG tablet [Pharmacy Med Name: Losartan Potassium Oral Tablet 50 MG]; Take 1 tablet (50 mg total) by mouth daily.  Dispense: 90 tablet; Refill: 0  - atenoloL (TENORMIN) 100 MG tablet [Pharmacy Med Name: Atenolol Oral Tablet 100 MG]; TAKE ONE TABLET BY MOUTH ONCE DAILY   Dispense: 90 tablet; Refill: 0    2. Mixed hyperlipidemia  - simvastatin (ZOCOR) 20 MG tablet [Pharmacy Med Name: Simvastatin Oral Tablet 20 MG]; Take 1 tablet (20 mg total) by mouth at bedtime.  Dispense: 90 tablet; Refill: 0    If protocol passes  may refill for 12 months if within 3 months of last provider visit (or a total of 15 months).             Passed - Serum potassium within the past 12 months     Lab Results   Component Value Date    Potassium 4.0 07/30/2019             Passed - Blood pressure filed in past 12 months     BP Readings from Last 1 Encounters:   07/30/19 132/80             Passed - Serum creatinine within the past 12 months     Creatinine   Date Value Ref Range Status   07/30/2019 1.49 (H) 0.70 - 1.30 mg/dL Final                losartan (COZAAR) 50 MG tablet [Pharmacy Med Name: Losartan Potassium Oral Tablet 50 MG] 90 tablet 0     Sig: Take 1 tablet (50 mg total) by mouth daily.       Angiotensin Receptor Blocker Protocol Passed - 6/22/2020  3:03 AM        Passed - PCP or prescribing provider visit in past 12 months       Last office visit with prescriber/PCP: 7/11/2019 Quirino Laureano MD OR same dept: 7/11/2019 Quirino Laureano MD OR same specialty: 7/11/2019 Quirino Laureano MD  Last physical: Visit date not found Last MTM visit: Visit date not found   Next visit within 3 mo: Visit date not found  Next physical within 3 mo: Visit date not found  Prescriber OR PCP: Quirino Laureano MD  Last diagnosis associated with med order: 1. Essential hypertension  - amLODIPine (NORVASC) 10 MG tablet [Pharmacy Med Name: amLODIPine Besylate Oral Tablet 10 MG]; Take 1 tablet (10 mg total) by mouth daily.  Dispense: 90 tablet; Refill: 0  - losartan (COZAAR) 100 MG tablet [Pharmacy Med Name: Losartan Potassium Oral Tablet 100 MG]; Take 1 tablet (100 mg total) by mouth daily.  Dispense: 90 tablet; Refill: 0  - losartan (COZAAR) 50 MG tablet [Pharmacy Med Name: Losartan Potassium Oral Tablet 50 MG]; Take 1 tablet (50 mg total) by mouth daily.  Dispense: 90 tablet; Refill: 0  - atenoloL (TENORMIN) 100 MG tablet [Pharmacy Med Name: Atenolol Oral Tablet 100 MG]; TAKE ONE TABLET BY MOUTH ONCE DAILY   Dispense: 90 tablet; Refill:  0    2. Mixed hyperlipidemia  - simvastatin (ZOCOR) 20 MG tablet [Pharmacy Med Name: Simvastatin Oral Tablet 20 MG]; Take 1 tablet (20 mg total) by mouth at bedtime.  Dispense: 90 tablet; Refill: 0    If protocol passes may refill for 12 months if within 3 months of last provider visit (or a total of 15 months).             Passed - Serum potassium within the past 12 months     Lab Results   Component Value Date    Potassium 4.0 07/30/2019             Passed - Blood pressure filed in past 12 months     BP Readings from Last 1 Encounters:   07/30/19 132/80             Passed - Serum creatinine within the past 12 months     Creatinine   Date Value Ref Range Status   07/30/2019 1.49 (H) 0.70 - 1.30 mg/dL Final                simvastatin (ZOCOR) 20 MG tablet [Pharmacy Med Name: Simvastatin Oral Tablet 20 MG] 90 tablet 0     Sig: Take 1 tablet (20 mg total) by mouth at bedtime.       Statins Refill Protocol (Hmg CoA Reductase Inhibitors) Passed - 6/22/2020  3:03 AM        Passed - PCP or prescribing provider visit in past 12 months      Last office visit with prescriber/PCP: 7/11/2019 Quirino Laureano MD OR same dept: 7/11/2019 Quirino Laureano MD OR same specialty: 7/11/2019 Quirino Laureano MD  Last physical: Visit date not found Last MTM visit: Visit date not found   Next visit within 3 mo: Visit date not found  Next physical within 3 mo: Visit date not found  Prescriber OR PCP: Quirino Laureano MD  Last diagnosis associated with med order: 1. Essential hypertension  - amLODIPine (NORVASC) 10 MG tablet [Pharmacy Med Name: amLODIPine Besylate Oral Tablet 10 MG]; Take 1 tablet (10 mg total) by mouth daily.  Dispense: 90 tablet; Refill: 0  - losartan (COZAAR) 100 MG tablet [Pharmacy Med Name: Losartan Potassium Oral Tablet 100 MG]; Take 1 tablet (100 mg total) by mouth daily.  Dispense: 90 tablet; Refill: 0  - losartan (COZAAR) 50 MG tablet [Pharmacy Med Name: Losartan Potassium Oral Tablet 50 MG];  Take 1 tablet (50 mg total) by mouth daily.  Dispense: 90 tablet; Refill: 0  - atenoloL (TENORMIN) 100 MG tablet [Pharmacy Med Name: Atenolol Oral Tablet 100 MG]; TAKE ONE TABLET BY MOUTH ONCE DAILY   Dispense: 90 tablet; Refill: 0    2. Mixed hyperlipidemia  - simvastatin (ZOCOR) 20 MG tablet [Pharmacy Med Name: Simvastatin Oral Tablet 20 MG]; Take 1 tablet (20 mg total) by mouth at bedtime.  Dispense: 90 tablet; Refill: 0    If protocol passes may refill for 12 months if within 3 months of last provider visit (or a total of 15 months).                atenoloL (TENORMIN) 100 MG tablet [Pharmacy Med Name: Atenolol Oral Tablet 100 MG] 90 tablet 0     Sig: TAKE ONE TABLET BY MOUTH ONCE DAILY       Beta-Blockers Refill Protocol Passed - 6/22/2020  3:03 AM        Passed - PCP or prescribing provider visit in past 12 months or next 3 months     Last office visit with prescriber/PCP: 7/11/2019 Quirino Laureano MD OR same dept: 7/11/2019 Quirino Laureano MD OR same specialty: 7/11/2019 Quirino Laureano MD  Last physical: Visit date not found Last MTM visit: Visit date not found   Next visit within 3 mo: Visit date not found  Next physical within 3 mo: Visit date not found  Prescriber OR PCP: Quirino Laureano MD  Last diagnosis associated with med order: 1. Essential hypertension  - amLODIPine (NORVASC) 10 MG tablet [Pharmacy Med Name: amLODIPine Besylate Oral Tablet 10 MG]; Take 1 tablet (10 mg total) by mouth daily.  Dispense: 90 tablet; Refill: 0  - losartan (COZAAR) 100 MG tablet [Pharmacy Med Name: Losartan Potassium Oral Tablet 100 MG]; Take 1 tablet (100 mg total) by mouth daily.  Dispense: 90 tablet; Refill: 0  - losartan (COZAAR) 50 MG tablet [Pharmacy Med Name: Losartan Potassium Oral Tablet 50 MG]; Take 1 tablet (50 mg total) by mouth daily.  Dispense: 90 tablet; Refill: 0  - atenoloL (TENORMIN) 100 MG tablet [Pharmacy Med Name: Atenolol Oral Tablet 100 MG]; TAKE ONE TABLET BY MOUTH ONCE  DAILY   Dispense: 90 tablet; Refill: 0    2. Mixed hyperlipidemia  - simvastatin (ZOCOR) 20 MG tablet [Pharmacy Med Name: Simvastatin Oral Tablet 20 MG]; Take 1 tablet (20 mg total) by mouth at bedtime.  Dispense: 90 tablet; Refill: 0    If protocol passes may refill for 12 months if within 3 months of last provider visit (or a total of 15 months).             Passed - Blood pressure filed in past 12 months     BP Readings from Last 1 Encounters:   07/30/19 132/80

## 2021-06-10 NOTE — PROGRESS NOTES
ASSESSMENT & PLAN:    1. Hyperlipidemia  Check levels today and follow-up based on results  Continue work on diet and exercise  - Lipid Cascade  - Comprehensive Metabolic Panel    2. Hypertension  Blood pressure at goal range  Continue current medications at this time  - HM2(CBC w/o Differential)    3. Health care maintenance  Due for yearly labs will obtain yearly labs and follow-up based on results  - PSA, Annual Screen (Prostatic-Specific Antigen)    4. Non morbid obesity, unspecified obesity type  Patient aware he needs to lose weight  Discussed diet and exercise changes to improve health overall      There are no Patient Instructions on file for this visit.    Orders Placed This Encounter   Procedures     Lipid Cascade     Order Specific Question:   Fasting is required?     Answer:   Yes     Comprehensive Metabolic Panel     PSA, Annual Screen (Prostatic-Specific Antigen)     HM2(CBC w/o Differential)     There are no discontinued medications.    No Follow-up on file.    CHIEF COMPLAINT:  Chief Complaint   Patient presents with     Hyperlipidemia     Medication check - fasting labs      Hypertension     Medication check        HISTORY OF PRESENT ILLNESS:  Josh is a 56 y.o. male presenting to the clinic today for a medication check.     Hyperlipidemia: He continues to take 20 mg of simvastatin daily. He has managed well on this medication and denies side effects. His cholesterol levels were within goal range last year.     Hypertension: His blood pressure is in a good range today. He has managed well on 100-25 mg of atenolol-chlorthalidone daily, 10 mg of amlodipine daily, and 150 mg of losartan daily.     Health Maintenance: He is due for a PSA.     REVIEW OF SYSTEMS:   He does not get as much sleep as he would like because of how busy he is with work. He has gained 13 pounds since last year and knows he needs to lose some weight. He is fasting today for labs. All other systems are negative.    PFSH:  He works  as a  and is busy. His mother is 98 years old and is doing fairly well. He admits that he has been eating late at night and consumes more junk food than he should. He has not been walking as much as he used to. He was a lot more active when he had a dog. He enjoys hunting.     TOBACCO USE:  History   Smoking Status     Never Smoker   Smokeless Tobacco     Not on file       VITALS:  Vitals:    05/19/17 1516   BP: 126/72   Patient Site: Right Arm   Patient Position: Sitting   Cuff Size: Adult Large   Pulse: 68   Resp: 16   Temp: 97.9  F (36.6  C)   TempSrc: Oral   Weight: (!) 254 lb (115.2 kg)     Wt Readings from Last 3 Encounters:   05/19/17 (!) 254 lb (115.2 kg)   05/05/16 (!) 241 lb (109.3 kg)   05/07/15 (!) 249 lb (112.9 kg)       QUALITY MEASURES:  The following high BMI interventions were performed this visit: encouragement to exercise and weight monitoring    PHYSICAL EXAM:  GENERAL APPEARANCE: Alert, cooperative, no distress, appears stated age   LUNGS: Clear to auscultation bilaterally, respirations unlabored .  HEART: Regular rate and rhythm, S1 and S2 normal, no murmur, rub, or gallop. No lower extremity edema.   Abdomen: Obese, soft, positive bowel sounds  NEUROLOGIC: No gross focal deficits  PSYCHOLOGIC: Normal mood and affect      ADDITIONAL HISTORY SUMMARIZED (FROM OLD RECORDS OR HISTORY FROM SOMEONE OTHER THAN THE PATIENT OR ANOTHER HEALTHCARE PROVIDER) (2 TOTAL): None.     DECISION TO OBTAIN EXTRA INFORMATION (OLD RECORDS REQUESTED OR HISTORY FROM ANOTHER PERSON OR ACCESSING CARE EVERYWHERE) (1 TOTAL): None.     RADIOLOGY TESTS SUMMARIZED OR ORDERED (XRAY/CT/MRI/DXA) (1 TOTAL): None.    LABS REVIEWED OR ORDERED (1 TOTAL): Reviewed labs from 5/5/2016. Labs ordered.     MEDICINE TESTS SUMMARIZED OR ORDERED (EKG/ECHO) (1 TOTAL): None.    INDEPENDENT REVIEW OF EKG OR X-RAY (2 EACH): None.     The visit lasted a total of 12 minutes face to face with the patient. Over 50% of the time was spent  counseling and educating the patient about his medications.    I, Dharmesh Kelly, am scribing for and in the presence of, Dr. Laureano.    I, Dr. Laureano, personally performed the services described in this documentation, as scribed by Dharmesh Kelly in my presence, and it is both accurate and complete.    MEDICATIONS:  Current Outpatient Prescriptions   Medication Sig Dispense Refill     amLODIPine (NORVASC) 10 MG tablet TAKE 1 TABLET (10 MG) BY MOUTH ONCE DAILY 90 tablet 0     aspirin 81 MG EC tablet Take 81 mg by mouth daily.       atenolol-chlorthalidone (TENORETIC) 100-25 mg per tablet TAKE 1 TABLET BY MOUTH ONCE DAILY 90 tablet 0     losartan (COZAAR) 100 MG tablet TAKE ONE TABLET BY MOUTH ONCE DAILY  90 tablet 0     losartan (COZAAR) 50 MG tablet Take 1 tablet (50 mg total) by mouth daily. 90 tablet 3     MULTIVITAMIN (MULTIPLE VITAMIN ORAL) Take by mouth.       simvastatin (ZOCOR) 20 MG tablet TAKE 1 TABLET (20 MG) BY MOUTH NIGHTLY AT BEDTIME 90 tablet 0     No current facility-administered medications for this visit.        Total Data Points: 1

## 2021-06-11 NOTE — TELEPHONE ENCOUNTER
Left message to call back for: Appointment   Information to relay to patient:  Left message, yes he is due for fasting labs with physical/medication check.

## 2021-06-11 NOTE — TELEPHONE ENCOUNTER
Medication Question or Clarification  Who is calling: patient  What medication are you calling about (include dose and sig)?: multiple  Who prescribed the medication?: Quirino Laureano MD  What is your question/concern?: Patient is not sure if he is due for a visit for medication check.  He is scheduling a virtual appointment.  IF he does not need please call him.    Requested Pharmacy: [facility] [id number]  Okay to leave a detailed message?: Yes

## 2021-06-13 NOTE — TELEPHONE ENCOUNTER
Question following Office Visit  When did you see your provider: 11/20/20  What is your question:  Patient had his blood pressure checked in the clinic on 11/20/20.  Result was 157/90 which was a surprise to him.  This caused him to realize that his home blood pressure monitor was off up to 20 points.  He has bought a new blood pressure monitor (brachial) and has been checked his pressures at home, 2 to 3 times a day since 11/20/20.  His pressures remain elevated at 133-154/72-88.   Patient is currently taking 4 blood pressure medications.  He would like to ask Dr. Laureano about adjusting his blood pressure medications to bring them down to normal range.    Okay to leave a detailed message: Yes

## 2021-06-13 NOTE — PROGRESS NOTES
[facility] Note    Name: Josh Mark  : 1960   MRN: [id number]    Josh Mark is a 60 y.o. male presenting to discuss the following:     CC:   Chief Complaint   Patient presents with     Medication Education Visit     Refills       HPI:  Villa presents today for medication refill.  He needs refills of all of his medications.  He denies any side effects from his medications.  He also would like routine screening labs.  He is fasting today.    He reports strong family history of hypertension, mother with difficult to manage blood pressures.    ROS:   No fevers, no chills, no vision changes, no chest pain, no shortness of breath, no lower extremity edema.    PMH:   Patient Active Problem List   Diagnosis     Obesity     Hyperlipidemia     Hypertension     Benign Adenomatous Polyp Of The Large Intestine     Essential Hypertriglyceridemia     Chronic Kidney Disease, Stage 3     Obesity (BMI 35.0-39.9) with comorbidity (H)       No past medical history on file.    PSH:   No past surgical history on file.      MEDICATIONS:   Current Outpatient Medications on File Prior to Visit   Medication Sig Dispense Refill     amLODIPine (NORVASC) 10 MG tablet Take 1 tablet (10 mg total) by mouth daily. 90 tablet 0     aspirin 81 MG EC tablet Take 81 mg by mouth daily.       atenoloL (TENORMIN) 100 MG tablet Take 1 tablet (100 mg total) by mouth daily. 90 tablet 0     cloNIDine HCL (CATAPRES) 0.1 MG tablet Take 1 tablet (0.1 mg total) by mouth 2 (two) times a day. 180 tablet 1     losartan (COZAAR) 100 MG tablet Take 1 tablet (100 mg total) by mouth daily. 90 tablet 0     losartan (COZAAR) 50 MG tablet Take 1 tablet (50 mg total) by mouth daily. 90 tablet 0     MULTIVITAMIN (MULTIPLE VITAMIN ORAL) Take by mouth.       simvastatin (ZOCOR) 20 MG tablet Take 1 tablet (20 mg total) by mouth at bedtime. 90 tablet 0     No current facility-administered medications on file prior to visit.        ALLERGIES:  No Known  "Allergies    FAMHx:  No family history on file.    SOCIAL HISTORY:   Social History     Tobacco Use     Smoking status: Never Smoker     Smokeless tobacco: Never Used   Substance Use Topics     Alcohol use: Not on file     Drug use: Not on file         PHYSICAL EXAM:   /90   Pulse 61   Ht 5' 7\" (1.702 m)   Wt (!) 233 lb (105.7 kg)   BMI 36.49 kg/m     GENERAL: Josh is a pleasant, well-appearing male, overweight, no acute distress.  HEART: Regular rate and rhythm, no murmurs.  LUNGS: Clear to auscultation bilaterally, unlabored.  ABDOMEN: Soft, nontender to palpation.  No palpable masses.  EXTREMITIES: No lower extremity edema.  Pulses intact.    ASSESSMENT & PLAN:   Josh Mark is a 60 y.o. male presenting today for med check.    1. Essential hypertension  - Comprehensive Metabolic Panel  - amLODIPine (NORVASC) 10 MG tablet; Take 1 tablet (10 mg total) by mouth daily.  Dispense: 90 tablet; Refill: 3  - atenoloL (TENORMIN) 100 MG tablet; Take 1 tablet (100 mg total) by mouth daily.  Dispense: 90 tablet; Refill: 3  - cloNIDine HCL (CATAPRES) 0.1 MG tablet; Take 1 tablet (0.1 mg total) by mouth 2 (two) times a day.  Dispense: 180 tablet; Refill: 3  - losartan (COZAAR) 100 MG tablet; Take 1 tablet (100 mg total) by mouth daily.  Dispense: 90 tablet; Refill: 3  - losartan (COZAAR) 50 MG tablet; Take 1 tablet (50 mg total) by mouth daily.  Dispense: 90 tablet; Refill: 3    Blood pressure is elevated today.  He states he checks his blood pressure at home and it is in the normal range at home.  Recommended we increase his blood pressure management as it is above the treatment threshold in clinic today.  Discussed treatment goal less than 130/80.  He declines any medication adjustments and states he will  a new blood pressure cuff and continue monitoring at home.  If his new blood pressure cuff shows elevated blood pressures, he states he will reach out for medication adjustment.    He is on multiple " medications.  Discussed that it is unusual to use a higher dose of losartan than 100 mg daily, but as he has been stable on this for long-term, we will not adjust today.     2. Mixed hyperlipidemia  - Comprehensive Metabolic Panel  - Lipid Cascade FASTING  - simvastatin (ZOCOR) 20 MG tablet; Take 1 tablet (20 mg total) by mouth at bedtime.  Dispense: 90 tablet; Refill: 3    Continue simvastatin.  Recheck labs today.    3. Medication management  - HM2(CBC w/o Differential)    We will screen for anemia given chronic aspirin use.    4. Screening for prostate cancer  - PSA, Annual Screen (Prostatic-Specific Antigen)    5. Immunization due  - Td, Preservative Free (green label)    RTC: 1 month-follow-up blood pressure    Geri Jones DO

## 2021-06-13 NOTE — TELEPHONE ENCOUNTER
Please inform patient to increase clonidine to 0.2mg twice daily- I will send new script to pharmacy.  HE can take two at once of what he has to use that up- recommend recheck blood pressure in 2 weeks.

## 2021-06-13 NOTE — PROGRESS NOTES
Normal PSA, lipid panel, and CBC.   Kidney function is stable. Total bilirubin mildly elevated, stable. Remaining liver enzymes normal.  Patient updated by letter in the mail.  Geri Jones, DO

## 2021-06-13 NOTE — TELEPHONE ENCOUNTER
Patient Returning Call  Reason for call:  Return call   Information relayed to patient:  Patient was informed of the message below. Patient verbalized understanding and has no further questions or concerns at this time.  Patient has additional questions:  No  If YES, what are your questions/concerns:  n/a  Okay to leave a detailed message?: No call back needed

## 2021-06-15 NOTE — PROGRESS NOTES
Pts blood pressure was cut in half due to dizziness- has this resolved?  What have blood pressures been running at home?

## 2021-06-15 NOTE — TELEPHONE ENCOUNTER
Left message to call back for: BP check  Information to relay to patient:  LMTCB please relay below message.

## 2021-06-15 NOTE — TELEPHONE ENCOUNTER
Patient coming in 3/17/2021 for lab and BP check:    Quirino Laureano MD   3/4/2021 10:02 AM CST      Please inform patient that labs are showing the new medication for blood pressure is irritating the kidney.  I would like him to try this medication for 10 days, then come in for nurse blood pressure check and lab to recheck kidney function.     Please enter lab order, thanks!

## 2021-06-15 NOTE — TELEPHONE ENCOUNTER
----- Message from Quirino Laureano MD sent at 3/16/2021  8:36 AM CDT -----    Pts blood pressure was cut in half due to dizziness- has this resolved?  What have blood pressures been running at home?    ----- Message -----  From: Linda Mitchell CMA  Sent: 3/15/2021   4:27 PM CDT  To: Quirino Laureano MD    Pt complaining of lower back pain under the rib cage since starting the hydralazine

## 2021-06-15 NOTE — PROGRESS NOTES
Please have patient cut hydrochlorothiazide in 1/2  See if this improves the dropping of blood pressure and symptoms over the next week.  Dr. Laureano

## 2021-06-15 NOTE — TELEPHONE ENCOUNTER
Spoke to pt and relayed MD message. Pt agrees with plan. Please send new med to pharmacy. No call back needed

## 2021-06-15 NOTE — TELEPHONE ENCOUNTER
Reason contacted:  BP   Information relayed:   Patient states dizziness was with previous BP medication hydrochlorothiazide. Since changing medication dizziness has resolved.   His BP readings at home have been high in the morning ranging from low 160-150/89-92 and heart rate 50-52. In the evenings BP ranges from 112-130's/68-84 heart rate 54-63.    Does have some bilateral flank discomfort from the bottom of the rib cage to belt area.

## 2021-06-15 NOTE — TELEPHONE ENCOUNTER
----- Message from Quirino Laureano MD sent at 3/4/2021 10:02 AM CST -----  Please inform patient that labs are showing the new medication for blood pressure is irritating the kidney- in combination with his side effects reported I would like him to stop hydrochlorothiazide and start an alternative medication.  This next category unfortunately has to be taken three times daily. We could try with twice daily if he would like- its called hydralazine- it does not affect his kidney.  I would like him to try this medication for 10 days, then come in for nurse blood pressure check and lab to recheck kidney function.  If he is in agreement I will send new script to pharmacy.

## 2021-06-15 NOTE — TELEPHONE ENCOUNTER
----- Message from Quirino Laureano MD sent at 3/2/2021 11:28 AM CST -----    Please have patient cut hydrochlorothiazide in 1/2  See if this improves the dropping of blood pressure and symptoms over the next week.  Dr. Laureano    ----- Message -----  From: Rocío Brizuela CMA  Sent: 3/1/2021   2:11 PM CST  To: Quirino Laureano MD

## 2021-06-15 NOTE — PROGRESS NOTES
Follow Up Blood Pressure Check    Josh Mark is a 60 y.o. male recommended to follow up for blood pressure check by Quirino Laureano MD. Anihypertensive medications and adherence were verified: Yes.     Reason for visit:     Medication change at last visit:     Today's Vitals:   Vitals:    03/15/21 1625   BP: 157/88   Patient Site: Left Arm   Patient Position: Sitting   Cuff Size: Adult Large   Pulse: 68       Home blood pressure readings brought in today:       Lowest blood pressure today is 157/88 and they deny signs or symptoms of new onset: denies, severe headache, fatigue, confusion, vision changes, chest pain, pounding in the chest, neck, ears, irregular heartbeat, difficulty breathing and blood in the urine.  Please inform patient of his/her blood pressure today.  If they are asymptomatic, the patient is to continue current medications.  This message will be routed to their provider, and they will be notified if a change in medication is recommended.        Linda Mitchell    Current Outpatient Medications   Medication Sig Dispense Refill     amLODIPine (NORVASC) 10 MG tablet Take 1 tablet (10 mg total) by mouth daily. 90 tablet 3     aspirin 81 MG EC tablet Take 81 mg by mouth daily.       atenoloL (TENORMIN) 100 MG tablet Take 1 tablet (100 mg total) by mouth daily. 90 tablet 3     cloNIDine HCL (CATAPRES) 0.2 MG tablet Take 1 tablet (0.2 mg total) by mouth 2 (two) times a day. 180 tablet 1     hydrALAZINE (APRESOLINE) 10 MG tablet Take 1 tablet (10 mg total) by mouth 3 (three) times a day. 90 tablet 1     losartan (COZAAR) 100 MG tablet Take 1 tablet (100 mg total) by mouth daily. 90 tablet 3     losartan (COZAAR) 50 MG tablet Take 1 tablet (50 mg total) by mouth daily. 90 tablet 3     MULTIVITAMIN (MULTIPLE VITAMIN ORAL) Take by mouth.       simvastatin (ZOCOR) 20 MG tablet Take 1 tablet (20 mg total) by mouth at bedtime. 90 tablet 3     No current facility-administered medications for this  visit.    ;p

## 2021-06-15 NOTE — TELEPHONE ENCOUNTER
Called chen.  Increase hydralazine to 25mg twice daily  Use heat for back- do not suspect back due to hydralazine- will monitor  F/u in 10 days

## 2021-06-15 NOTE — PROGRESS NOTES
Follow Up Blood Pressure Check    Josh Mark is a 60 y.o. male recommended to follow up for blood pressure check by Quirino Laureano MD. Anihypertensive medications and adherence were verified: Yes.     Reason for visit:  Started Hydrochlorothiazide     Medication change at last visit: See above    Today's Vitals:   Vitals:    03/01/21 1410   BP: 136/78   Patient Site: Left Arm   Patient Position: Sitting   Cuff Size: Adult Large   Pulse: 61       Home blood pressure readings brought in today:     Taking all BP medications in the morning. Noticing BP is typically 130's/70-80's. About 45-60 min after taking medications BP drops to about 96/60. Denies any vision change.     Lowest blood pressure today is less than 140/90 and they are experiencing signs or symptoms of new onset: Dizziness upon standing .  Please inform patient of his/her blood pressure today.  If they are asymptomatic, the patient is to continue current medications.  This message will be routed to their provider, and they will be notified if a change in medication is recommended.    Rocío Brizuela    Current Outpatient Medications   Medication Sig Dispense Refill     amLODIPine (NORVASC) 10 MG tablet Take 1 tablet (10 mg total) by mouth daily. 90 tablet 3     aspirin 81 MG EC tablet Take 81 mg by mouth daily.       atenoloL (TENORMIN) 100 MG tablet Take 1 tablet (100 mg total) by mouth daily. 90 tablet 3     cloNIDine HCL (CATAPRES) 0.2 MG tablet Take 1 tablet (0.2 mg total) by mouth 2 (two) times a day. 180 tablet 1     hydroCHLOROthiazide (HYDRODIURIL) 25 MG tablet Take 1 tablet (25 mg total) by mouth daily. 30 tablet 0     losartan (COZAAR) 100 MG tablet Take 1 tablet (100 mg total) by mouth daily. 90 tablet 3     losartan (COZAAR) 50 MG tablet Take 1 tablet (50 mg total) by mouth daily. 90 tablet 3     MULTIVITAMIN (MULTIPLE VITAMIN ORAL) Take by mouth.       simvastatin (ZOCOR) 20 MG tablet Take 1 tablet (20 mg total) by mouth at bedtime.  90 tablet 3     No current facility-administered medications for this visit.

## 2021-06-15 NOTE — PROGRESS NOTES
Assessment:      Healthy male exam.    HCM-  HTN  Hyperlipidemia  Colon cancer screening  Plan:       All questions answered.   HCM-patient here for annual exam-is due for electrolytes and kidney function today otherwise has had recent blood work [id number] on immunizations discussed Covid vaccine  HTN-blood pressure remains elevated in addition patient has had quite dry since increasing clonidine-we will add hydrochlorothiazide 25 mg daily and attempt to bring blood pressure down to hopefully build to remove clonidine he will return for nurse blood pressure check in 10 days  Hyperlipidemia-patient is on statin therapy cholesterol levels have been at goal continue with current dosing  Colon cancer screening-discussed colon cancer screening options patient like to pursue Cologuard  Subjective:      Josh Mark is a 60 y.o. male who presents for an annual exam. The patient reports that there is not domestic violence in his life.  Patient is here for annual exam.  Patient has elevated blood pressure still we discussed lifestyle modifications to help lower blood pressure.  He has had increasing problems with dry eyes with increasing clonidine we will try to start hydrochlorothiazide to lower blood pressure and hopefully be able to remove clonidine in the near future.  Discussed exercise.  Patient is on statin therapy will continue with current dosing has his cholesterol levels are at goal range.  Discussed the Covid pandemic and possible Covid vaccine in the future.    Patient recently purchased a dog, a lab, which is excited about training.    Healthy Habits:   Regular Exercise: No  Healthy Diet: Yes  Dental Visits Regularly: Yes  Seat Belt: Yes  Sexually active: Yes  Lipid Profile: Yes  Glucose Screen: Yes        Immunization History   Administered Date(s) Administered     DT (pediatric) 11/23/1999     INFLUENZA,RECOMBINANT,INJ,PF QUADRIVALENT 18+YRS 10/14/2020     Td, adult adsorbed, PF 11/20/2020      Td,adult,historic,unspecified 11/23/1999, 07/15/2009     Tdap 07/15/2009     Immunization status: up to date and documented.    No exam data present    Current Outpatient Medications   Medication Sig Dispense Refill     amLODIPine (NORVASC) 10 MG tablet Take 1 tablet (10 mg total) by mouth daily. 90 tablet 3     aspirin 81 MG EC tablet Take 81 mg by mouth daily.       atenoloL (TENORMIN) 100 MG tablet Take 1 tablet (100 mg total) by mouth daily. 90 tablet 3     cloNIDine HCL (CATAPRES) 0.2 MG tablet Take 1 tablet (0.2 mg total) by mouth 2 (two) times a day. 180 tablet 1     losartan (COZAAR) 100 MG tablet Take 1 tablet (100 mg total) by mouth daily. 90 tablet 3     losartan (COZAAR) 50 MG tablet Take 1 tablet (50 mg total) by mouth daily. 90 tablet 3     MULTIVITAMIN (MULTIPLE VITAMIN ORAL) Take by mouth.       simvastatin (ZOCOR) 20 MG tablet Take 1 tablet (20 mg total) by mouth at bedtime. 90 tablet 3     hydroCHLOROthiazide (HYDRODIURIL) 25 MG tablet Take 1 tablet (25 mg total) by mouth daily. 30 tablet 0     No current facility-administered medications for this visit.      No past medical history on file.  No past surgical history on file.  Patient has no known allergies.  No family history on file.  Social History     Socioeconomic History     Marital status: Single     Spouse name: Not on file     Number of children: Not on file     Years of education: Not on file     Highest education level: Not on file   Occupational History     Not on file   Social Needs     Financial resource strain: Not on file     Food insecurity     Worry: Not on file     Inability: Not on file     Transportation needs     Medical: Not on file     Non-medical: Not on file   Tobacco Use     Smoking status: Never Smoker     Smokeless tobacco: Never Used   Substance and Sexual Activity     Alcohol use: Not on file     Drug use: Not on file     Sexual activity: Not on file   Lifestyle     Physical activity     Days per week: Not on file      "Minutes per session: Not on file     Stress: Not on file   Relationships     Social connections     Talks on phone: Not on file     Gets together: Not on file     Attends Hindu service: Not on file     Active member of club or organization: Not on file     Attends meetings of clubs or organizations: Not on file     Relationship status: Not on file     Intimate partner violence     Fear of current or ex partner: Not on file     Emotionally abused: Not on file     Physically abused: Not on file     Forced sexual activity: Not on file   Other Topics Concern     Not on file   Social History Narrative     Not on file       Review of Systems  General:  Denies problem  Eyes: Denies problem  Ears/Nose/Throat: Denies problem  Cardiovascular: Denies problem  Respiratory:  Denies problem  Gastrointestinal:  Denies problem  Genitourinary: Denies problem  Musculoskeletal:  Denies problem  Skin: Denies problem  Neurologic: Denies problem  Psychiatric: Denies problem  Endocrine: Denies problem  Heme/Lymphatic: Denies problem   Allergic/Immunologic: Denies problem        Objective:     Vitals:    02/17/21 1222   BP: (!) 157/93   Pulse: (!) 57   Resp: 16   Temp: 98.1  F (36.7  C)   TempSrc: Oral   Weight: (!) 229 lb 6.4 oz (104.1 kg)   Height: 5' 7\" (1.702 m)     Body mass index is 35.93 kg/m .    Physical  General Appearance: Alert, cooperative, no distress, appears stated age  Head: Normocephalic, without obvious abnormality, atraumatic  Eyes: PERRL, conjunctiva/corneas clear, EOM's intact  Ears: Normal TM's and external ear canals, both ears  Back: Symmetric, no curvature, ROM normal, no CVA tenderness  Lungs: Clear to auscultation bilaterally, respirations unlabored  Heart: Regular rate and rhythm, S1 and S2 normal, no murmur, rub, or gallop,  Abdomen: Soft, non-tender, bowel sounds active  Musculoskeletal: Normal range of motion. No joint swelling or deformity.   Extremities: Extremities normal, atraumatic, no cyanosis or " edema  Skin: Skin color, texture, turgor normal, no rashes or lesions  Neurologic: He is alert. He has normal reflexes.   Psychiatric: He has a normal mood and affect.

## 2021-06-15 NOTE — TELEPHONE ENCOUNTER
Left message to call back for: results  Information to relay to patient:  LMTCB, Please see message below from .

## 2021-06-15 NOTE — TELEPHONE ENCOUNTER
Patient returning Rocío's call. I tried to relay message but patient would like Rocío to call him back.

## 2021-06-15 NOTE — TELEPHONE ENCOUNTER
Patient notified. Will keep track of BP over the next week and come back for BP check next Friday.

## 2021-06-16 PROBLEM — E66.01 MORBID OBESITY (H): Status: ACTIVE | Noted: 2019-07-11

## 2021-06-16 NOTE — PROGRESS NOTES
Assessment:     1. Pain of right knee after injury  XR Knee Right Plus [place] VW    XR Knee Right Plus [place] VW    ibuprofen (ADVIL,MOTRIN) 600 MG tablet   2. Enthesopathy, knee  predniSONE (DELTASONE) 20 MG tablet    ibuprofen (ADVIL,MOTRIN) 600 MG tablet     Xr Knee Right Plus [place] Vw    Result Date: 3/17/2018  EXAM DATE:         03/17/2018 [facility] X-RAY KNEE, RIGHT, 3 VIEWS 3/17/2018 4:15 PM INDICATION: r/o fracture COMPARISON: None FINDINGS: No acute fracture or dislocation. Mild degenerative spurring of the patellofemoral joint. Enthesopathy of the extensor mechanism.            Plan:     Discussed x-ray results with the patient.  Advised him to use anti-inflammatory, and prednisone.  He may also wear a knee brace while at work.  Also may use cool compresses and a warm compress alternating.  Keep his foot elevated.  Monitor for worsening symptoms.  He may follow-up with his PCP if symptoms does not resolve after suggested treatment.    Subjective:       57 y.o. male presents for evaluation of right knee pain.  He reports that about 2 weeks ago [place] his knee and fell.  He experienced pain initially which was resolving then today his pain got worse.  He reports that he took ibuprofen about an hour ago and his he had to make sure that he did not have a fracture.  He reports that at his job he stands all day.  He denies any swelling to the area, he admits to tenderness with deep palpation, he is not sure if he has pain or numbness.  He denies any other symptoms including nausea, vomiting, diarrhea, shortness of breath, or fever.    The following portions of the patient's history were reviewed and updated as appropriate: allergies, current medications, past family history, past medical history, past social history, past surgical history and problem list.    Review of Systems  A 12 point comprehensive review of systems was negative except as  noted.     Objective:      /68 (Patient Site: Right Arm, Patient Position: Sitting, Cuff Size: Adult Large)  Pulse 83  Temp 98.2  F (36.8  C) (Oral)   Resp 14  Wt (!) 249 lb 8 oz (113.2 kg)  SpO2 95%  BMI 38.5 kg/m2  General appearance: alert, appears stated age, cooperative and mild distress  Head: Normocephalic, without obvious abnormality, atraumatic  Eyes: conjunctivae/corneas clear. PERRL, EOM's intact. Fundi benign.  Ears: normal TM's and external ear canals both ears  Back: symmetric, no curvature. ROM normal. No CVA tenderness.  Lungs: clear to auscultation bilaterally  Extremities: no edema, redness or tenderness in the calves or thighs and right knee tenderness with palpation to the medial condyle, no swelling, no erythema noted.   Skin: Skin color, texture, turgor normal. No rashes or lesions  Neurologic: Grossly normal     This note has been dictated using voice recognition software. Any grammatical or context distortions are unintentional and inherent to the software

## 2021-06-16 NOTE — PROGRESS NOTES
Follow Up Blood Pressure Check    Josh Mark is a 60 y.o. male recommended to follow up for blood pressure check by Quirino Laureano MD. Anihypertensive medications and adherence were verified: Yes.     Reason for visit: Elevated blood pressure    Medication change at last visit: none    Today's Vitals:   Vitals:    03/26/21 0834   BP: 126/80   Patient Site: Left Arm   Patient Position: Sitting   Cuff Size: Adult Large   Pulse: (!) 59   Resp: 16       Home blood pressure readings brought in today:   135/82, p 62, 140/84, p 60, 122/78, p 59, 128/67, p 62    Lowest blood pressure today is 126/80 and they deny signs or symptoms of new onset: severe headache, fatigue, confusion, vision changes, chest pain, pounding in the chest, neck, ears, irregular heartbeat, difficulty breathing and blood in the urine.  Please inform patient of his/her blood pressure today.  If they are asymptomatic, the patient is to continue current medications.  This message will be routed to their provider, and they will be notified if a change in medication is recommended.        Miesha Stephenson    Current Outpatient Medications   Medication Sig Dispense Refill     amLODIPine (NORVASC) 10 MG tablet Take 1 tablet (10 mg total) by mouth daily. 90 tablet 3     aspirin 81 MG EC tablet Take 81 mg by mouth daily.       atenoloL (TENORMIN) 100 MG tablet Take 1 tablet (100 mg total) by mouth daily. 90 tablet 3     cloNIDine HCL (CATAPRES) 0.2 MG tablet Take 1 tablet (0.2 mg total) by mouth 2 (two) times a day. 180 tablet 1     hydrALAZINE (APRESOLINE) 25 MG tablet Take 1 tablet (25 mg total) by mouth 2 (two) times a day before meals. 60 tablet 1     losartan (COZAAR) 100 MG tablet Take 1 tablet (100 mg total) by mouth daily. 90 tablet 3     losartan (COZAAR) 50 MG tablet Take 1 tablet (50 mg total) by mouth daily. 90 tablet 3     MULTIVITAMIN (MULTIPLE VITAMIN ORAL) Take by mouth.       simvastatin (ZOCOR) 20 MG tablet Take 1 tablet (20 mg total)  by mouth at bedtime. 90 tablet 3     No current facility-administered medications for this visit.

## 2021-06-16 NOTE — TELEPHONE ENCOUNTER
----- Message from Quirino Laureano MD sent at 3/26/2021  9:45 AM CDT -----      ----- Message -----  From: Miesha Stephenson CMA  Sent: 3/26/2021   8:39 AM CDT  To: Quirino Laureano MD

## 2021-06-16 NOTE — TELEPHONE ENCOUNTER
----- Message from Quirino Laureano MD sent at 3/16/2021 10:04 AM CDT -----  Kidney function back to baseline with removal of hydrochlorothiazide- electrolytes normal.

## 2021-06-16 NOTE — TELEPHONE ENCOUNTER
Reason contacted:  Bp check  Information relayed:  Informed patient of message below. Patient states understanding  Additional questions:  No  Further follow-up needed:  No  Okay to leave a detailed message:  No       Quirino Laureano MD at 3/26/2021  9:45 AM    Status: Signed      Blood pressure at goal- continue with current regimen

## 2021-06-17 NOTE — TELEPHONE ENCOUNTER
BP controlled at last check. Up to date labs. Refill sent to pharmacy.  Covering for PCP.  Geri Jones, DO  
Covering provider please fill  
Reason for Call:  Medication or medication refill:    Do you use a [facility]?  Name of the pharmacy and phone number for the current request: [id number] - [place], [place] - [address]    Name of the medication requested: hydrALAZINE (APRESOLINE) 25 MG tablet    Other request: pt is out of medication as of Sunday    Can we leave a detailed message on this number? Yes    Phone number patient can be reached at:   Cell number on file:    Telephone Information:   Mobile [phone]       Best Time: na    Call taken on 5/14/2021 at 12:23 PM by Aneta Zuñiga  
L LE/weight-bearing as tolerated

## 2021-06-17 NOTE — PROGRESS NOTES
ASSESSMENT/PLAN  1. Health care maintenance  Patient is here for yearly fasting labs  Is up-to-date on immunizations  Is due for yearly physical  - HM2(CBC w/o Differential)  - Comprehensive Metabolic Panel  - PSA, Annual Screen (Prostatic-Specific Antigen)  - Lipid [place] FASTING    2. Mixed hyperlipidemia  Patient is doing well on current medications here for medication check and labs  Refills of his medications sent to the pharmacy  - simvastatin (ZOCOR) 20 MG tablet; Take 1 tablet (20 mg total) by mouth at bedtime.  Dispense: 90 tablet; Refill: 3    3. Essential hypertension  Blood pressure goal range  Patient is here solely because he is asked to come in for lab  Continue current medications at this time  - losartan (COZAAR) 50 MG tablet; Take 1 tablet (50 mg total) by mouth daily.  Dispense: 90 tablet; Refill: 3  - losartan (COZAAR) 100 MG tablet; Take 1 tablet (100 mg total) by mouth daily.  Dispense: 90 tablet; Refill: 3  - atenolol-chlorthalidone (TENORETIC) 100-25 mg per tablet; Take 1 tablet by mouth daily.  Dispense: 90 tablet; Refill: 3  - amLODIPine (NORVASC) 10 MG tablet; Take 1 tablet (10 mg total) by mouth daily.  Dispense: 90 tablet; Refill: 3        SUBJECTIVE:   Chief Complaint   Patient presents with     Blood Pressure Check     Currently taking amlodipine 10 mg, atenlol-chlorthalidone 100-25 mg, losartan 150 mg daily     Josh Mark 57 y.o. male    Current Outpatient Prescriptions   Medication Sig Dispense Refill     amLODIPine (NORVASC) 10 MG tablet Take 1 tablet (10 mg total) by mouth daily. 90 tablet 3     aspirin 81 MG EC tablet Take 81 mg by mouth daily.       atenolol-chlorthalidone (TENORETIC) 100-25 mg per tablet Take 1 tablet by mouth daily. 90 tablet 3     ibuprofen (ADVIL,MOTRIN) 600 MG tablet Take 1 tablet (600 mg total) by mouth every 6 (six) hours as needed for pain. 60 tablet 0     losartan (COZAAR) 100 MG tablet Take 1 tablet (100 mg total) by mouth daily. 90 tablet 3      losartan (COZAAR) 50 MG tablet Take 1 tablet (50 mg total) by mouth daily. 90 tablet 3     MULTIVITAMIN (MULTIPLE VITAMIN ORAL) Take by mouth.       simvastatin (ZOCOR) 20 MG tablet Take 1 tablet (20 mg total) by mouth at bedtime. 90 tablet 3     No current facility-administered medications for this visit.      Allergies: Review of patient's allergies indicates no known allergies.   No LMP for male patient.    HPI:   Patient is here for medication check.  Patient takes medications for cholesterol and blood pressure.  He is asked to come in for labs as he is overdue for a visit.  He is due for physical exam.  He is fasting today we will obtain yearly fasting labs.  Blood pressures at goal range he is tolerating his current medications without complication will send refills to the pharmacy.  Will check cholesterol levels and follow-up based on results.  He continues work as a .  He is aware of his weight and the need for weight loss.  Patient continues to hunt and fish on a regular basis as a hobby    ROS: negative except as per HPI    OBJECTIVE:   The patient appears well, alert, oriented x 3, in no distress.  /70 (Patient Site: Right Arm, Patient Position: Sitting, Cuff Size: Adult Large)  Pulse 64  Temp 97.7  F (36.5  C) (Oral)   Resp 16  Wt (!) 249 lb (112.9 kg)  BMI 38.42 kg/m2    Lungs: clear, good air entry, no wheezes, rhonchi or rales.   Cardiac: S1 and S2 normal, no murmurs, regular rate and rhythm.   Abdomen: normal bowel sounds, soft   Extremities: show no edema, normal peripheral pulses.   Neurological: normal, no focal findings.  Skin: clear, dry, no rashes/lesions  Psych- normal mood and affect      Pt states an understanding and agrees to the above plan.

## 2021-06-17 NOTE — PROGRESS NOTES
ASSESSMENT/PLAN  1. Hypokalemia  Hypokalemia thought secondary to chlorthalidone use  Discussed with patient this is been a long-standing problem for him  He like to work on dietary changes for improvement will return in 10 days for a potassium recheck  If remains low we will start supplement  He like to maintain on current medications as they have been working well to control his blood pressure  He denies any cardiac symptoms  - Potassium  - Potassium; Future    2. Mixed hyperlipidemia  Patient knows that his eating has not been ideal and has been eating poorly in the last year he like to work on dietary changes to help lower cholesterol levels and maintain on current statin dosing        SUBJECTIVE:   Chief Complaint   Patient presents with     Follow-up     follow on on results      Josh Mark 57 y.o. male    Current Outpatient Prescriptions   Medication Sig Dispense Refill     amLODIPine (NORVASC) 10 MG tablet Take 1 tablet (10 mg total) by mouth daily. 90 tablet 3     aspirin 81 MG EC tablet Take 81 mg by mouth daily.       atenolol-chlorthalidone (TENORETIC) 100-25 mg per tablet Take 1 tablet by mouth daily. 90 tablet 3     losartan (COZAAR) 100 MG tablet Take 1 tablet (100 mg total) by mouth daily. 90 tablet 3     losartan (COZAAR) 50 MG tablet Take 1 tablet (50 mg total) by mouth daily. 90 tablet 3     MULTIVITAMIN (MULTIPLE VITAMIN ORAL) Take by mouth.       simvastatin (ZOCOR) 20 MG tablet Take 1 tablet (20 mg total) by mouth at bedtime. 90 tablet 3     ibuprofen (ADVIL,MOTRIN) 600 MG tablet Take 1 tablet (600 mg total) by mouth every 6 (six) hours as needed for pain. 60 tablet 0     No current facility-administered medications for this visit.      Allergies: Review of patient's allergies indicates no known allergies.   No LMP for male patient.    HPI:   Patient is here for laboratory follow-up was seen recently and laboratory work showed marked elevation in his cholesterol from last year but still  having problems with hypokalemia.  He is instructed last year change things in his diet and follow-up which he did not do.  Patient takes chlorthalidone is 1 of his blood pressure medications we discussed is likely the cause of his hypokalemia.  We discussed dietary changes that he can work on versus supplement with potassium or change blood pressure medications.  He like to work on dietary changes.  He will return in 10 days for a potassium recheck-he denies any kind of symptoms of shortness of breath or palpitations but will be seen immediately if you have any chest pain or shortness of breath.  We discussed his elevation levels of cholesterol and he realizes he needs to make some dietary changes he like to work on this rather than adjusting dosing of statin-reviewed how he has been steadily increasing over the last couple years with cholesterol levels both in total and LDL    ROS: negative except as per HPI    OBJECTIVE:   The patient appears well, alert, oriented x 3, in no distress.  /80 (Patient Site: Right Arm, Patient Position: Sitting, Cuff Size: Adult Large)  Pulse 72  Resp 16  Wt (!) 248 lb 5 oz (112.6 kg)  BMI 38.32 kg/m2  Lungs: clear, good air entry, no wheezes, rhonchi or rales.   Cardiac: S1 and S2 normal, no murmurs, regular rate and rhythm.   Neurological: normal, no focal findings.  Skin: clear, dry, no rashes/lesions  Psych- normal mood and affect      Pt states an understanding and agrees to the above plan.

## 2021-06-19 NOTE — LETTER
Letter by Quirino Laureano MD at      Author: Quirino Laureano MD Service: -- Author Type: --    Filed:  Encounter Date: 11/21/2019 Status: Signed       Josh Mark  [address]  [place] [facility] [address]    November 21, 2019    Dear Josh    In reviewing your records, we have determined a gap in your preventive services. Based on your age and health history, we recommend the follow service.     ? General Physical  ? Physical with a Pap Smear  ? Colon cancer screening  ? Mammogram  ? Immunization  ? Diabetic check  ? Blood pressure/cardiovascular check  ? Asthma check  ? Cholesterol test  ? Lab work  ? Med check    If you have had the service elsewhere, please contact us so we can update our records. Please let us know if you have transferred your care to another clinic.    Please call [phone] to schedule this appointment.    We believe that a strong preventive care program, including regular physicals and follow-up care is an important part of a healthy lifestyle and we are committed to helping you maintain your health.    Thank you for choosing us as your health care provider.    Sincerely,   [facility]  [address] [facility] [address]  Phone Number:  [phone]

## 2021-06-19 NOTE — LETTER
Letter by Quirino Laureano MD at      Author: Quirino Laureano MD Service: -- Author Type: --    Filed:  Encounter Date: 8/1/2019 Status: (Other)         Josh Mark  [address]  [place] [facility] [place] [address]             August 1, 2019         Dear Mr. Mark,    Below are the results from your recent visit:    Resulted Orders   Basic Metabolic Panel   Result Value Ref Range    Sodium 141 136 - 145 mmol/L    Potassium 4.0 3.5 - 5.0 mmol/L    Chloride 106 98 - 107 mmol/L    CO2 23 22 - 31 mmol/L    Anion Gap, Calculation 12 5 - 18 mmol/L    Glucose 72 70 - 125 mg/dL    Calcium 9.5 8.5 - 10.5 mg/dL    BUN 16 8 - 22 mg/dL    Creatinine 1.49 (H) 0.70 - 1.30 mg/dL    GFR MDRD Af Amer 58 (L) >60 mL/min/1.73m2    GFR MDRD Non Af Amer 48 (L) >60 mL/min/1.73m2    Narrative    Fasting Glucose reference range is 70-99 mg/dL per  American Diabetes Association (ADA) guidelines.       Potassium levels have normalized.  Kidney function has slightly improved with the change of medications.  Continue to keep well-hydrated.   Recommend continued use of the current medications.     Please call with questions or contact us using [employer].    Sincerely,        Electronically signed by Quirino Laureano MD

## 2021-06-19 NOTE — LETTER
Letter by Quirino Laureano MD at      Author: Quirino Laureano MD Service: -- Author Type: --    Filed:  Encounter Date: 7/12/2019 Status: (Other)         Josh Mark  [address]  [place] [facility] [place] [address]             July 12, 2019         Dear Mr. Mark,    Below are the results from your recent visit:    Resulted Orders   Comprehensive Metabolic Panel   Result Value Ref Range    Sodium 139 136 - 145 mmol/L    Potassium 3.1 (L) 3.5 - 5.0 mmol/L    Chloride 99 98 - 107 mmol/L    CO2 28 22 - 31 mmol/L    Anion Gap, Calculation 12 5 - 18 mmol/L    Glucose 78 70 - 125 mg/dL    BUN 21 8 - 22 mg/dL    Creatinine 1.56 (H) 0.70 - 1.30 mg/dL    GFR MDRD Af Amer 55 (L) >60 mL/min/1.73m2    GFR MDRD Non Af Amer 45 (L) >60 mL/min/1.73m2    Bilirubin, Total 1.1 (H) 0.0 - 1.0 mg/dL    Calcium 9.6 8.5 - 10.5 mg/dL    Protein, Total 7.2 6.0 - 8.0 g/dL    Albumin 4.2 3.5 - 5.0 g/dL    Alkaline Phosphatase 57 45 - 120 U/L    AST 31 0 - 40 U/L    ALT 29 0 - 45 U/L    Narrative    Fasting Glucose reference range is 70-99 mg/dL per  American Diabetes Association (ADA) guidelines.   HM2(CBC w/o Differential)   Result Value Ref Range    WBC 9.2 4.0 - 11.0 thou/uL    RBC 5.00 4.40 - 6.20 mill/uL    Hemoglobin 15.8 14.0 - 18.0 g/dL    Hematocrit 46.8 40.0 - 54.0 %    MCV 94 80 - 100 fL    MCH 31.5 27.0 - 34.0 pg    MCHC 33.7 32.0 - 36.0 g/dL    RDW 11.7 11.0 - 14.5 %    Platelets 230 140 - 440 thou/uL    MPV 6.9 (L) 7.0 - 10.0 fL   Lipid Cascade   Result Value Ref Range    Cholesterol 186 <=199 mg/dL    Triglycerides 182 (H) <=149 mg/dL    HDL Cholesterol 42 >=40 mg/dL    LDL Calculated 108 <=129 mg/dL    Patient Fasting > 8hrs? Yes    PSA (Prostatic-Specific Antigen), Annual Screen   Result Value Ref Range    PSA 2.0 0.0 - 4.5 ng/mL    Narrative    Method is Abbott Prostate-Specific Antigen (PSA)  Standard-WHO 1st International (90:10)        Liver enzymes are normal.  Kidney function is slightly decreased as  it is been in the past few years and  potassium level remains low.  I would recommend discontinuing his chlorthalidone and starting a new  medication called clonidine.  This will help improve the potassium levels and hopefully improve kidney  function.  If you are in agreement I will send a new prescriptions to the pharmacy one for clonidine and  One for atenolol. Cholesterol levels are at goal range.  They have improved from last year.    PSA level is stable.  No other concerns on blood work.     Please call with questions or contact us using [employer].    Sincerely,        Electronically signed by Quirino Laureano MD

## 2021-06-20 NOTE — LETTER
Letter by Quirino Laureano MD at      Author: Quirino Laureano MD Service: -- Author Type: --    Filed:  Encounter Date: 1/14/2020 Status: Signed       Josh Mark  [address]  [place] [facility] [address]    January 14, 2020        Dear Josh    In reviewing your records, we have determined a gap in your preventive services. Based on your age and health history, we recommend the follow service.     ? General Physical  ? Physical with a Pap Smear  ? Colon cancer screening  ? Mammogram  ? Immunization  ? Diabetic check  ? Blood pressure/cardiovascular check  ? Asthma check  ? Cholesterol test  ? Lab work  ? Med check    If you have had the service elsewhere, please contact us so we can update our records. Please let us know if you have transferred your care to another clinic.    Please call [phone] to schedule this appointment.    We believe that a strong preventive care program, including regular physicals and follow-up care is an important part of a healthy lifestyle and we are committed to helping you maintain your health.    Thank you for choosing us as your health care provider.    Sincerely,   [facility]  [address] [facility] [address]  Phone Number:  [phone]

## 2021-06-21 NOTE — LETTER
Letter by Geri Jones DO at      Author: Geri Jones DO Service: -- Author Type: --    Filed:  Encounter Date: 11/21/2020 Status: (Other)         Josh Mark  [address]  [place] [facility] [place] [address]             November 21, 2020         Dear Mr. Mark,    Thank you for coming into the clinic. Below are the results from your recent visit:    Resulted Orders   Comprehensive Metabolic Panel   Result Value Ref Range    Sodium 141 136 - 145 mmol/L    Potassium 3.6 3.5 - 5.0 mmol/L    Chloride 105 98 - 107 mmol/L    CO2 26 22 - 31 mmol/L    Anion Gap, Calculation 10 5 - 18 mmol/L    Glucose 76 70 - 125 mg/dL    BUN 20 8 - 22 mg/dL    Creatinine 1.36 (H) 0.70 - 1.30 mg/dL    GFR MDRD Af Amer >60 >60 mL/min/1.73m2    GFR MDRD Non Af Amer 53 (L) >60 mL/min/1.73m2    Bilirubin, Total 1.2 (H) 0.0 - 1.0 mg/dL    Calcium 9.3 8.5 - 10.5 mg/dL    Protein, Total 7.3 6.0 - 8.0 g/dL    Albumin 4.4 3.5 - 5.0 g/dL    Alkaline Phosphatase 64 45 - 120 U/L    AST 28 0 - 40 U/L    ALT 24 0 - 45 U/L    Narrative    Fasting Glucose reference range is 70-99 mg/dL per  American Diabetes Association (ADA) guidelines.   Lipid [place] FASTING   Result Value Ref Range    Cholesterol 189 <=199 mg/dL    Triglycerides 123 <=149 mg/dL    HDL Cholesterol 45 >=40 mg/dL    LDL Calculated 119 <=129 mg/dL    Patient Fasting > 8hrs? Yes    PSA, Annual Screen (Prostatic-Specific Antigen)   Result Value Ref Range    PSA 2.1 0.0 - 4.5 ng/mL    Narrative    Method is Abbott Prostate-Specific Antigen (PSA)  Standard-WHO 1st International (90:10)   HM2(CBC w/o Differential)   Result Value Ref Range    WBC 8.3 4.0 - 11.0 thou/uL    RBC 5.01 4.40 - 6.20 mill/uL    Hemoglobin 15.5 14.0 - 18.0 g/dL    Hematocrit 46.3 40.0 - 54.0 %    MCV 92 80 - 100 fL    MCH 30.9 27.0 - 34.0 pg    MCHC 33.5 32.0 - 36.0 g/dL    RDW 12.0 11.0 - 14.5 %    Platelets 162 140 - 440 thou/uL    MPV 6.8 (L) 7.0 - 10.0 fL       1. Your kidney function is stable. Your  electrolytes are normal.   2. Your total bilirubin level is mildly elevated but has been in the past. The rest of your liver tests are normal.   3. Your cholesterol numbers look good. Continue your statin and aspirin.  4. Your PSA to screen for prostate cancer is normal.  5. Your blood counts are normal.    Please call with questions or contact us using [employer].    Sincerely,        Electronically signed by Geri Jones, DO

## 2021-06-21 NOTE — LETTER
Letter by Quirino Laureano MD at      Author: Quirino Laureano MD Service: -- Author Type: --    Filed:  Encounter Date: 3/16/2021 Status: (Other)         Josh Mark  [address]  [place] [facility] [place] [address]             March 16, 2021         Dear Mr. Mark,    Below are the results from your recent visit:    Resulted Orders   Basic Metabolic Panel   Result Value Ref Range    Sodium 141 136 - 145 mmol/L    Potassium 3.9 3.5 - 5.0 mmol/L    Chloride 104 98 - 107 mmol/L    CO2 25 22 - 31 mmol/L    Anion Gap, Calculation 12 5 - 18 mmol/L    Glucose 84 70 - 125 mg/dL    Calcium 8.9 8.5 - 10.5 mg/dL    BUN 17 8 - 22 mg/dL    Creatinine 1.42 (H) 0.70 - 1.30 mg/dL    GFR MDRD Af Amer >60 >60 mL/min/1.73m2    GFR MDRD Non Af Amer 51 (L) >60 mL/min/1.73m2    Narrative    Fasting Glucose reference range is 70-99 mg/dL per  American Diabetes Association (ADA) guidelines.       Kidney function back to baseline with removal of hydrochlorothiazide- electrolytes normal.     Please call with questions or contact us using [employer].    Sincerely,        Electronically signed by Quirino Laureano MD

## 2021-07-23 ENCOUNTER — RECORDS - HEALTHEAST (OUTPATIENT)
Dept: ADMINISTRATIVE | Facility: CLINIC | Age: 61
End: 2021-07-23

## 2021-11-18 DIAGNOSIS — I10 ESSENTIAL HYPERTENSION: ICD-10-CM

## 2021-11-18 DIAGNOSIS — E78.2 MIXED HYPERLIPIDEMIA: ICD-10-CM

## 2021-11-18 NOTE — TELEPHONE ENCOUNTER
Reason for Call:  Medication or medication refill:    Do you use a [facility]?  Name of the pharmacy and phone number for the current request:    [facility]  [address]     Name of the medication requested:     amLODIPine (NORVASC) 10 MG tablet  10 mg, DAILY     aspirin 81 MG EC tablet    81 mg, DAILY     atenoloL (TENORMIN) 100 MG tablet  100 mg, DAILY     cloNIDine HCL (CATAPRES) 0.2 MG tablet  0.2 mg, 2 TIMES DAILY      hydrALAZINE (APRESOLINE) 25 MG tablet  25 mg, 2 TIMES DAILY BEFORE MEALS     losartan (COZAAR) 100 MG tablet  100 mg, DAILY     losartan (COZAAR) 50 MG tablet  50 mg, DAILY     simvastatin (ZOCOR) 20 MG tablet  20 mg, AT BEDTIME         Other request: na  Can we leave a detailed message on this number? YES    Phone number patient can be reached at: Cell number on file:    Telephone Information:   Mobile [phone]       Best Time: any    Call taken on 11/18/2021 at 4:16 PM by ERIKA CHACKO

## 2021-11-19 RX ORDER — SIMVASTATIN 20 MG
20 TABLET ORAL AT BEDTIME
Qty: 90 TABLET | Refills: 1 | Status: SHIPPED | OUTPATIENT
Start: 2021-11-19 | End: 2022-02-14

## 2021-11-19 RX ORDER — CLONIDINE HYDROCHLORIDE 0.2 MG/1
0.2 TABLET ORAL 2 TIMES DAILY
Qty: 180 TABLET | Refills: 1 | Status: SHIPPED | OUTPATIENT
Start: 2021-11-19 | End: 2022-05-03

## 2021-11-19 RX ORDER — LOSARTAN POTASSIUM 100 MG/1
100 TABLET ORAL DAILY
Qty: 90 TABLET | Refills: 1 | Status: SHIPPED | OUTPATIENT
Start: 2021-11-19 | End: 2022-06-06

## 2021-11-19 RX ORDER — ATENOLOL 100 MG/1
100 TABLET ORAL DAILY
Qty: 90 TABLET | Refills: 1 | Status: SHIPPED | OUTPATIENT
Start: 2021-11-19 | End: 2022-06-06

## 2021-11-19 RX ORDER — LOSARTAN POTASSIUM 50 MG/1
50 TABLET ORAL DAILY
Qty: 90 TABLET | Refills: 1 | Status: SHIPPED | OUTPATIENT
Start: 2021-11-19 | End: 2022-06-06

## 2021-11-19 RX ORDER — HYDRALAZINE HYDROCHLORIDE 25 MG/1
25 TABLET, FILM COATED ORAL
Qty: 90 TABLET | Refills: 1 | Status: SHIPPED | OUTPATIENT
Start: 2021-11-19 | End: 2021-12-06

## 2021-11-19 RX ORDER — AMLODIPINE BESYLATE 10 MG/1
10 TABLET ORAL DAILY
Qty: 90 TABLET | Refills: 1 | Status: SHIPPED | OUTPATIENT
Start: 2021-11-19 | End: 2022-06-06

## 2021-12-06 DIAGNOSIS — I10 ESSENTIAL HYPERTENSION: ICD-10-CM

## 2021-12-06 RX ORDER — HYDRALAZINE HYDROCHLORIDE 25 MG/1
25 TABLET, FILM COATED ORAL
Qty: 180 TABLET | Refills: 1 | Status: SHIPPED | OUTPATIENT
Start: 2021-12-06 | End: 2022-05-19

## 2021-12-06 NOTE — TELEPHONE ENCOUNTER
Rx request from pharmacy stating qty is incorrect to please change to #180. Please review and advise.

## 2022-02-11 ENCOUNTER — OFFICE VISIT (OUTPATIENT)
Dept: FAMILY MEDICINE | Facility: CLINIC | Age: 62
End: 2022-02-11
Payer: COMMERCIAL

## 2022-02-11 VITALS
BODY MASS INDEX: 38.3 KG/M2 | WEIGHT: 244 LBS | DIASTOLIC BLOOD PRESSURE: 90 MMHG | HEIGHT: 67 IN | RESPIRATION RATE: 16 BRPM | SYSTOLIC BLOOD PRESSURE: 151 MMHG | HEART RATE: 64 BPM

## 2022-02-11 DIAGNOSIS — Z00.00 HEALTH CARE MAINTENANCE: Primary | ICD-10-CM

## 2022-02-11 DIAGNOSIS — E66.01 MORBID OBESITY (H): ICD-10-CM

## 2022-02-11 DIAGNOSIS — E78.2 MIXED HYPERLIPIDEMIA: ICD-10-CM

## 2022-02-11 DIAGNOSIS — I10 PRIMARY HYPERTENSION: ICD-10-CM

## 2022-02-11 LAB
ALBUMIN SERPL-MCNC: 4.2 G/DL (ref 3.5–5)
ALP SERPL-CCNC: 70 U/L (ref 45–120)
ALT SERPL W P-5'-P-CCNC: 26 U/L (ref 0–45)
ANION GAP SERPL CALCULATED.3IONS-SCNC: 11 MMOL/L (ref 5–18)
AST SERPL W P-5'-P-CCNC: 28 U/L (ref 0–40)
BILIRUB SERPL-MCNC: 1.2 MG/DL (ref 0–1)
BUN SERPL-MCNC: 15 MG/DL (ref 8–22)
CALCIUM SERPL-MCNC: 9.5 MG/DL (ref 8.5–10.5)
CHLORIDE BLD-SCNC: 105 MMOL/L (ref 98–107)
CHOLEST SERPL-MCNC: 198 MG/DL
CO2 SERPL-SCNC: 25 MMOL/L (ref 22–31)
CREAT SERPL-MCNC: 1.46 MG/DL (ref 0.7–1.3)
ERYTHROCYTE [DISTWIDTH] IN BLOOD BY AUTOMATED COUNT: 12.8 % (ref 10–15)
FASTING STATUS PATIENT QL REPORTED: YES
GFR SERPL CREATININE-BSD FRML MDRD: 54 ML/MIN/1.73M2
GLUCOSE BLD-MCNC: 94 MG/DL (ref 70–125)
HCT VFR BLD AUTO: 47.2 % (ref 40–53)
HDLC SERPL-MCNC: 41 MG/DL
HGB BLD-MCNC: 16.1 G/DL (ref 13.3–17.7)
LDLC SERPL CALC-MCNC: 128 MG/DL
MCH RBC QN AUTO: 31.1 PG (ref 26.5–33)
MCHC RBC AUTO-ENTMCNC: 34.1 G/DL (ref 31.5–36.5)
MCV RBC AUTO: 91 FL (ref 78–100)
PLATELET # BLD AUTO: 220 10E3/UL (ref 150–450)
POTASSIUM BLD-SCNC: 4.4 MMOL/L (ref 3.5–5)
PROT SERPL-MCNC: 7.5 G/DL (ref 6–8)
PSA SERPL-MCNC: 3.32 UG/L (ref 0–4.5)
RBC # BLD AUTO: 5.18 10E6/UL (ref 4.4–5.9)
SODIUM SERPL-SCNC: 141 MMOL/L (ref 136–145)
TRIGL SERPL-MCNC: 143 MG/DL
WBC # BLD AUTO: 7.4 10E3/UL (ref 4–11)

## 2022-02-11 PROCEDURE — 80061 LIPID PANEL: CPT | Performed by: FAMILY MEDICINE

## 2022-02-11 PROCEDURE — 99396 PREV VISIT EST AGE 40-64: CPT | Performed by: FAMILY MEDICINE

## 2022-02-11 PROCEDURE — 80053 COMPREHEN METABOLIC PANEL: CPT | Performed by: FAMILY MEDICINE

## 2022-02-11 PROCEDURE — 36415 COLL VENOUS BLD VENIPUNCTURE: CPT | Performed by: FAMILY MEDICINE

## 2022-02-11 PROCEDURE — 85027 COMPLETE CBC AUTOMATED: CPT | Performed by: FAMILY MEDICINE

## 2022-02-11 PROCEDURE — 99213 OFFICE O/P EST LOW 20 MIN: CPT | Mod: 25 | Performed by: FAMILY MEDICINE

## 2022-02-11 PROCEDURE — G0103 PSA SCREENING: HCPCS | Performed by: FAMILY MEDICINE

## 2022-02-11 ASSESSMENT — MIFFLIN-ST. JEOR: SCORE: 1860.41

## 2022-02-11 NOTE — PATIENT INSTRUCTIONS
Return for nurse blood pressure check in 2 weeks  If still elevated will increase clonidine    Check with insurance on coverage for the shingles vaccine

## 2022-02-11 NOTE — PROGRESS NOTES
SUBJECTIVE:   CC: Josh Mark is an 61 year old male who presents for preventative health visit.   Patient here for annual exam.  Patient is on multiple medications for blood pressure control but blood pressure remains elevated.  Patient has gained about 15 pounds and so saw him last and has been eating poorly.  He does not get good consistent sleep and he has been working a lot at his job.  We discussed the importance of changing lifestyle habits to help lower blood pressure and rechecking blood pressure in a couple weeks if blood pressures not lowering consider increasing clonidine to 0.3 mg twice daily.  Patient is on statin therapy will check cholesterol levels today.  We will follow based on blood work.  Discussed diet and exercise.  Patient's weight places him at obese at BMI of 38.  Patient expresses that when he is outside with his dog he sometimes takes of breath and when it is cold he feels a cold sensation in his chest-this only happens when he is outside in the cold environment.  Not associated with chest pain or shortness of breath at all-discussed with him monitoring at this time if symptoms continue to be present with physical exertion we want a further work-up possible cardiac etiology.  If he is only noticing this when is out in the cold weather he feels cool I do not know if there is anything further that we need to work-up in regards to this.  Patient has been advised of split billing requirements and indicates understanding: Yes  Healthy Habits:     Getting at least 3 servings of Calcium per day:  NO    Bi-annual eye exam:  NO    Dental care twice a year:  NO    Sleep apnea or symptoms of sleep apnea:  Daytime drowsiness    Diet:  Regular (no restrictions)    Frequency of exercise:  1 day/week    Duration of exercise:  15-30 minutes    Taking medications regularly:  Yes    Medication side effects:  None    PHQ-2 Total Score: 0    Additional concerns today:  No        Today's PHQ-2 Score:    PHQ-2 ( 1999 Pfizer) 2/11/2022   Q1: Little interest or pleasure in doing things 0   Q2: Feeling down, depressed or hopeless 0   PHQ-2 Score 0   Q1: Little interest or pleasure in doing things Not at all   Q2: Feeling down, depressed or hopeless Not at all   PHQ-2 Score 0       Abuse: Current or Past(Physical, Sexual or Emotional)- No  Do you feel safe in your environment? Yes    Have you ever done Advance Care Planning? (For example, a Health Directive, POLST, or a discussion with a medical provider or your loved ones about your wishes): Yes, patient states has an Advance Care Planning document and will bring a copy to the clinic.    Social History     Tobacco Use     Smoking status: Never Smoker     Smokeless tobacco: Never Used   Substance Use Topics     Alcohol use: Not on file         Alcohol Use 2/11/2022   Prescreen: >3 drinks/day or >7 drinks/week? Not Applicable       Last PSA:   Prostate Specific Antigen Screen   Date Value Ref Range Status   11/20/2020 2.1 0.0 - 4.5 ng/mL Final       Reviewed orders with patient. Reviewed health maintenance and updated orders accordingly - Yes  Lab work is in process    Reviewed and updated as needed this visit by clinical staff   Allergies  Meds             Reviewed and updated as needed this visit by Provider                   Review of Systems  CONSTITUTIONAL: NEGATIVE for fever, chills, change in weight  INTEGUMENTARY/SKIN: NEGATIVE for worrisome rashes, moles or lesions  EYES: NEGATIVE for vision changes or irritation  ENT: NEGATIVE for ear, mouth and throat problems  RESP: NEGATIVE for significant cough or SOB  CV: NEGATIVE for chest pain, palpitations or peripheral edema  GI: NEGATIVE for nausea, abdominal pain, heartburn, or change in bowel habits   male: negative for dysuria, hematuria, decreased urinary stream, erectile dysfunction, urethral discharge  MUSCULOSKELETAL: NEGATIVE for significant arthralgias or myalgia  NEURO: NEGATIVE for weakness, dizziness  or paresthesias  PSYCHIATRIC: NEGATIVE for changes in mood or affect    OBJECTIVE:   There were no vitals taken for this visit.    Physical Exam  GENERAL: healthy, alert and no distress  EYES: Eyes grossly normal to inspection, PERRL and conjunctivae and sclerae normal  RESP: lungs clear to auscultation - no rales, rhonchi or wheezes  CV: regular rate and rhythm, normal S1 S2, no S3 or S4, no murmur, click or rub, no peripheral edema and peripheral pulses strong  ABDOMEN: bowel sounds normal  MS: no gross musculoskeletal defects noted, no edema  NEURO: Normal strength and tone, mentation intact and speech normal  PSYCH: mentation appears normal, affect normal/bright    Diagnostic Test Results:  Labs reviewed in Epic    ASSESSMENT/PLAN:   Josh was seen today for physical and hypertension.    Diagnoses and all orders for this visit:    Health care maintenance  -     Lipid panel reflex to direct LDL Fasting; Future  -     Prostate Specific Antigen Screen; Future  -     CBC with platelets; Future  -     Comprehensive metabolic panel; Future  -     Lipid panel reflex to direct LDL Fasting  -     Prostate Specific Antigen Screen  -     CBC with platelets  -     Comprehensive metabolic panel  Obtain fasting blood work and follow-up based on results  Obesity (BMI 35.0-39.9) with comorbidity (H)  Discussed patient's weight gain and the need for weight loss  Discussed diet and exercise  Primary hypertension  Blood pressure elevated not at goal range discussed lifestyle modification and rechecking blood pressure in a couple weeks  If blood pressure remains elevated increase dosing on clonidine  Mixed hyperlipidemia  Patient is on statin therapy will check cholesterol levels today and follow-up based on results  Other orders  -     REVIEW OF HEALTH MAINTENANCE PROTOCOL ORDERS  -     ZOSTER VACCINE RECOMBINANT (Shingrix); Standing    Patient will check with insurance on coverage for shingles vaccine    Patient has been advised  "of split billing requirements and indicates understanding: Yes    COUNSELING:   Reviewed preventive health counseling, as reflected in patient instructions       Regular exercise       Healthy diet/nutrition    Estimated body mass index is 35.93 kg/m  as calculated from the following:    Height as of 2/17/21: 1.702 m (5' 7\").    Weight as of 2/17/21: 104.1 kg (229 lb 6.4 oz).     Weight management plan: Discussed healthy diet and exercise guidelines    He reports that he has never smoked. He has never used smokeless tobacco.      Counseling Resources:  ATP IV Guidelines  Pooled Cohorts Equation Calculator  FRAX Risk Assessment  ICSI Preventive Guidelines  Dietary Guidelines for Americans, 2010  USDA's MyPlate  ASA Prophylaxis  Lung CA Screening    Quirino Laureano MD  [facility]  "

## 2022-02-14 DIAGNOSIS — Z12.5 SCREENING FOR PROSTATE CANCER: Primary | ICD-10-CM

## 2022-02-14 DIAGNOSIS — E78.2 MIXED HYPERLIPIDEMIA: ICD-10-CM

## 2022-02-14 RX ORDER — SIMVASTATIN 40 MG
40 TABLET ORAL AT BEDTIME
Qty: 90 TABLET | Refills: 3 | Status: SHIPPED | OUTPATIENT
Start: 2022-02-14 | End: 2023-04-14

## 2022-02-18 ENCOUNTER — TELEPHONE (OUTPATIENT)
Dept: FAMILY MEDICINE | Facility: CLINIC | Age: 62
End: 2022-02-18
Payer: COMMERCIAL

## 2022-02-18 NOTE — LETTER
February 18, 2022      Josh LEXUS Deedee  [address]  [place] [facility] [place] [address]        Dear ,    We are writing to inform you of your test results.    PSA level has increased from 2.1-3.3.  With this increase I would like you to recheck your PSA level with a lab visit in 1 month.  Cholesterol levels are at the upper limits of normal.  With this I like you to increase your statin dosing to 40 mg I will send a new prescription to the pharmacy.  Kidney function is stable.  Liver enzymes are normal.  Glucose levels are at goal range showing no signs of diabetes.  No other concerns on blood work.       Resulted Orders   Lipid panel reflex to direct LDL Fasting   Result Value Ref Range    Cholesterol 198 <=199 mg/dL    Triglycerides 143 <=149 mg/dL    Direct Measure HDL 41 >=40 mg/dL      Comment:      HDL Cholesterol Reference Range:     0-2 years:   No reference ranges established for patients under 2 years old  at [facility] for lipid analytes.    2-8 years:  Greater than 45 mg/dL     18 years and older:   Female: Greater than or equal to 50 mg/dL   Male:   Greater than or equal to 40 mg/dL    LDL Cholesterol Calculated 128 <=129 mg/dL    Patient Fasting > 8hrs? Yes    Prostate Specific Antigen Screen   Result Value Ref Range    Prostate Specific Antigen Screen 3.32 0.00 - 4.50 ug/L    Narrative    Assay Method is Abbott Prostate-Specific Antigen (PSA)  Standard-WHO 1st International (90:10)   CBC with platelets   Result Value Ref Range    WBC Count 7.4 4.0 - 11.0 10e3/uL    RBC Count 5.18 4.40 - 5.90 10e6/uL    Hemoglobin 16.1 13.3 - 17.7 g/dL    Hematocrit 47.2 40.0 - 53.0 %    MCV 91 78 - 100 fL    MCH 31.1 26.5 - 33.0 pg    MCHC 34.1 31.5 - 36.5 g/dL    RDW 12.8 10.0 - 15.0 %    Platelet Count 220 150 - 450 10e3/uL   Comprehensive metabolic panel   Result Value Ref Range    Sodium 141 136 - 145 mmol/L    Potassium 4.4 3.5 - 5.0 mmol/L    Chloride 105 98 - 107 mmol/L    Carbon  Dioxide (CO2) 25 22 - 31 mmol/L    Anion Gap 11 5 - 18 mmol/L    Urea Nitrogen 15 8 - 22 mg/dL    Creatinine 1.46 (H) 0.70 - 1.30 mg/dL    Calcium 9.5 8.5 - 10.5 mg/dL    Glucose 94 70 - 125 mg/dL    Alkaline Phosphatase 70 45 - 120 U/L    AST 28 0 - 40 U/L    ALT 26 0 - 45 U/L    Protein Total 7.5 6.0 - 8.0 g/dL    Albumin 4.2 3.5 - 5.0 g/dL    Bilirubin Total 1.2 (H) 0.0 - 1.0 mg/dL    GFR Estimate 54 (L) >60 mL/min/1.73m2      Comment:      Effective December 21, 2021 eGFRcr in adults is calculated using the 2021 CKD-EPI creatinine equation which includes age and gender (Gorge et al., NEJM, DOI: 10.1056/YWWOlo3261488)       If you have any questions or concerns, please call the clinic at the number listed above.       Sincerely,

## 2022-02-18 NOTE — TELEPHONE ENCOUNTER
----- Message from Quirino Laureano MD sent at 2/14/2022 11:32 AM CST -----  Please inform patient that the PSA level has increased from 2.1-3.3.  With this increase I would like him to recheck his PSA level with a lab visit in 1 month.Cholesterol levels are at the upper limits of normal.  With this I like him to increase his statin dosing to 40 mg I will send a new prescription to the pharmacy.Kidney function is stable.  Liver enzymes are normal.  Glucose levels are at goal range showing no signs of diabetes.No other concerns on blood work.

## 2022-02-25 ENCOUNTER — TELEPHONE (OUTPATIENT)
Dept: FAMILY MEDICINE | Facility: CLINIC | Age: 62
End: 2022-02-25
Payer: COMMERCIAL

## 2022-02-25 NOTE — TELEPHONE ENCOUNTER
----- Message from Quirino Laureano MD sent at 2/25/2022  8:52 AM CST -----  Please inform patient that we just got results into our system of his Cologuard from last year.  The results are negative.  Unsure if he was made aware of this last year or not?  Current recommendation to be to recheck in 2 years.

## 2022-05-02 ENCOUNTER — ALLIED HEALTH/NURSE VISIT (OUTPATIENT)
Dept: FAMILY MEDICINE | Facility: CLINIC | Age: 62
End: 2022-05-02

## 2022-05-02 ENCOUNTER — LAB (OUTPATIENT)
Dept: LAB | Facility: CLINIC | Age: 62
End: 2022-05-02
Payer: COMMERCIAL

## 2022-05-02 VITALS — SYSTOLIC BLOOD PRESSURE: 147 MMHG | HEART RATE: 64 BPM | DIASTOLIC BLOOD PRESSURE: 89 MMHG

## 2022-05-02 DIAGNOSIS — Z01.30 BP CHECK: Primary | ICD-10-CM

## 2022-05-02 DIAGNOSIS — Z12.5 SCREENING FOR PROSTATE CANCER: ICD-10-CM

## 2022-05-02 DIAGNOSIS — I10 ESSENTIAL HYPERTENSION: ICD-10-CM

## 2022-05-02 LAB — PSA SERPL-MCNC: 2.81 UG/L (ref 0–4.5)

## 2022-05-02 PROCEDURE — 99207 PR NO CHARGE NURSE ONLY: CPT

## 2022-05-02 PROCEDURE — G0103 PSA SCREENING: HCPCS

## 2022-05-02 PROCEDURE — 36415 COLL VENOUS BLD VENIPUNCTURE: CPT

## 2022-05-02 NOTE — PROGRESS NOTES
Blood pressure remains elevated- would recommend increasing clonidine to 0,3mg twice daily- if he is in agreement I will send to the pharmacy

## 2022-05-02 NOTE — PROGRESS NOTES
Follow Up Blood Pressure Check    Josh Mark is a 61 year old male recommended to follow up for blood pressure check by Quirino Laureano Anihypertensive medications and adherence were verified: Yes.     Reason for visit: Elevated BP last OV 02/11/22      Medication change at last visit: No medication changes.    Today's Vitals:   Vitals:    05/02/22 1636 05/02/22 1644   BP: (!) 159/89 (!) 147/89   BP Location: Left arm Left arm   Patient Position: Sitting Sitting   Cuff Size: Adult Regular Adult Large   Pulse: 65 64       Lowest blood pressure today is <180/<110 and they deny signs or symptoms of new onset: severe headache, fatigue, confusion, vision changes, chest pain, pounding in the chest, neck, ears, irregular heartbeat, difficulty breathing and blood in the urine.  Please inform patient of his/her blood pressure today.  If they are asymptomatic, the patient is to continue current medications.  This message will be routed to their provider, and they will be notified if a change in medication is recommended.      Current Outpatient Medications   Medication Sig Dispense Refill     amLODIPine (NORVASC) 10 MG tablet Take 1 tablet (10 mg) by mouth daily 90 tablet 1     aspirin (ASA) 81 MG EC tablet Take 1 tablet (81 mg) by mouth daily 90 tablet 1     atenolol (TENORMIN) 100 MG tablet Take 1 tablet (100 mg) by mouth daily 90 tablet 1     cloNIDine (CATAPRES) 0.2 MG tablet Take 1 tablet (0.2 mg) by mouth 2 times daily 180 tablet 1     hydrALAZINE (APRESOLINE) 25 MG tablet Take 1 tablet (25 mg) by mouth 2 times daily (before meals) 180 tablet 1     losartan (COZAAR) 100 MG tablet Take 1 tablet (100 mg) by mouth daily 90 tablet 1     losartan (COZAAR) 50 MG tablet Take 1 tablet (50 mg) by mouth daily 90 tablet 1     MULTIVITAMIN (MULTIPLE VITAMIN ORAL) [MULTIVITAMIN (MULTIPLE VITAMIN ORAL)] Take by mouth.       simvastatin (ZOCOR) 40 MG tablet Take 1 tablet (40 mg) by mouth At Bedtime 90 tablet 3

## 2022-05-03 ENCOUNTER — TELEPHONE (OUTPATIENT)
Dept: FAMILY MEDICINE | Facility: CLINIC | Age: 62
End: 2022-05-03
Payer: COMMERCIAL

## 2022-05-03 RX ORDER — CLONIDINE HYDROCHLORIDE 0.3 MG/1
0.3 TABLET ORAL 2 TIMES DAILY
Qty: 180 TABLET | Refills: 1 | Status: SHIPPED | OUTPATIENT
Start: 2022-05-03 | End: 2022-05-19

## 2022-05-03 NOTE — TELEPHONE ENCOUNTER
Talked with patient regarding BP check visit on 5/2: informed him that clonidine script was sent to [facility] pharmacy in [place], assisted with scheduling BP check on 5/18.    Judah Hood RN     [facility]
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
## 2022-05-03 NOTE — PROGRESS NOTES
Informed patient of msg below. Scheduled for BP check on 5/18. See telephone encounter on 5/3.    Judah Hood RN     [facility]

## 2022-05-09 ENCOUNTER — TELEPHONE (OUTPATIENT)
Dept: FAMILY MEDICINE | Facility: CLINIC | Age: 62
End: 2022-05-09
Payer: COMMERCIAL

## 2022-05-09 NOTE — TELEPHONE ENCOUNTER
----- Message from Quirino Laureano MD sent at 5/9/2022 11:22 AM CDT -----  Please inform patient that PSA level has decreased since February.  We will continue to monitor.

## 2022-05-18 ENCOUNTER — ALLIED HEALTH/NURSE VISIT (OUTPATIENT)
Dept: FAMILY MEDICINE | Facility: CLINIC | Age: 62
End: 2022-05-18
Payer: COMMERCIAL

## 2022-05-18 VITALS — HEART RATE: 62 BPM | SYSTOLIC BLOOD PRESSURE: 141 MMHG | DIASTOLIC BLOOD PRESSURE: 86 MMHG

## 2022-05-18 DIAGNOSIS — I10 ESSENTIAL HYPERTENSION: ICD-10-CM

## 2022-05-18 DIAGNOSIS — Z01.30 BLOOD PRESSURE CHECK: Primary | ICD-10-CM

## 2022-05-18 PROCEDURE — 99207 PR NO CHARGE NURSE ONLY: CPT

## 2022-05-18 NOTE — PROGRESS NOTES
Follow Up Blood Pressure Check    Dr. Laureano please read note below:    Josh Mark is a 61 year old male recommended to follow up for blood pressure check by Quirino Laureano Anihypertensive medications and adherence were verified: Yes.     Reason for visit: BP check after med adjustment    Medication change at last visit: Clonidine 0.3 mg bid    Today's Vitals: 1548 reading is from home equipment  Vitals:    05/18/22 1547 05/18/22 1548   BP: 133/84 (!) 141/86   BP Location: Right arm Left arm   Patient Position: Sitting Sitting   Cuff Size: Adult Large Adult Regular   Pulse: 62 62       Home blood pressure readings brought in today:   Reading from 5/18/22  122/69 1500  96/57 1200  86/52 0900    Patient states that he is not tolerating the clonidine at 0.3 mg dosage. He is a  and states that since upping her clonidine he has been excessively tired and finds himself nodding off at work. Says he feels like his head is in a fishbowl after taking the medication. He said that his coworkers have noticed this as well. Patient states that he is going to take his previous dosage of 0.2mg clonidine bid until he hears back from PCP. See BP readings above.       Lowest blood pressure today is less than 140/90 and they deny signs or symptoms of new onset: severe headache, fatigue, confusion, vision changes, chest pain, pounding in the chest, neck, ears, irregular heartbeat, difficulty breathing and blood in the urine.      Judah Hood    Current Outpatient Medications   Medication Sig Dispense Refill     amLODIPine (NORVASC) 10 MG tablet Take 1 tablet (10 mg) by mouth daily 90 tablet 1     aspirin (ASA) 81 MG EC tablet Take 1 tablet (81 mg) by mouth daily 90 tablet 1     atenolol (TENORMIN) 100 MG tablet Take 1 tablet (100 mg) by mouth daily 90 tablet 1     cloNIDine (CATAPRES) 0.3 MG tablet Take 1 tablet (0.3 mg) by mouth 2 times daily 180 tablet 1     hydrALAZINE (APRESOLINE) 25 MG tablet Take 1 tablet (25  mg) by mouth 2 times daily (before meals) 180 tablet 1     losartan (COZAAR) 100 MG tablet Take 1 tablet (100 mg) by mouth daily 90 tablet 1     losartan (COZAAR) 50 MG tablet Take 1 tablet (50 mg) by mouth daily 90 tablet 1     MULTIVITAMIN (MULTIPLE VITAMIN ORAL) [MULTIVITAMIN (MULTIPLE VITAMIN ORAL)] Take by mouth.       simvastatin (ZOCOR) 40 MG tablet Take 1 tablet (40 mg) by mouth At Bedtime 90 tablet 3

## 2022-05-19 ENCOUNTER — TELEPHONE (OUTPATIENT)
Dept: FAMILY MEDICINE | Facility: CLINIC | Age: 62
End: 2022-05-19
Payer: COMMERCIAL

## 2022-05-19 RX ORDER — HYDRALAZINE HYDROCHLORIDE 50 MG/1
50 TABLET, FILM COATED ORAL
Qty: 180 TABLET | Refills: 1 | Status: SHIPPED | OUTPATIENT
Start: 2022-05-19 | End: 2022-12-29

## 2022-05-19 RX ORDER — CLONIDINE HYDROCHLORIDE 0.2 MG/1
0.2 TABLET ORAL 2 TIMES DAILY
Qty: 180 TABLET | Refills: 1 | Status: SHIPPED | OUTPATIENT
Start: 2022-05-19 | End: 2023-01-26

## 2022-05-19 NOTE — PROGRESS NOTES
Please let patient know that I agree- he should go back to 0.2mg on clonidine- I will send new script.  I will also send new hydralazine script for 50mg tablets- we will try to increase this to lower blood pressure and see how he feels.  He should recheck BP in 14 days

## 2022-05-19 NOTE — TELEPHONE ENCOUNTER
Left message to call back for: Bp check  Information to relay to patient: LMTCB, Please see message below.    Quirino Laureano MD at 5/18/2022  4:00 PM    Status: Signed      Please let patient know that I agree- he should go back to 0.2mg on clonidine- I will send new script.  I will also send new hydralazine script for 50mg tablets- we will try to increase this to lower blood pressure and see how he feels.  He should recheck BP in 14 days

## 2022-06-04 DIAGNOSIS — E78.2 MIXED HYPERLIPIDEMIA: ICD-10-CM

## 2022-06-04 DIAGNOSIS — I10 ESSENTIAL HYPERTENSION: ICD-10-CM

## 2022-06-05 NOTE — TELEPHONE ENCOUNTER
"Routing refill request to provider for review/approval because:  Labs out of range:  bp    Last Written Prescription Date:  11/19/21  Last Fill Quantity: 90,  # refills: 1  Last office visit provider:   2/11/22    Requested Prescriptions   Pending Prescriptions Disp Refills     losartan (COZAAR) 100 MG tablet [Pharmacy Med Name: Losartan Potassium Oral Tablet 100 MG] 90 tablet 0     Sig: Take 1 tablet (100 mg) by mouth once daily       Angiotensin-II Receptors Failed - 6/4/2022  2:00 AM        Failed - Last blood pressure under 140/90 in past 12 months     BP Readings from Last 3 Encounters:   05/18/22 (!) 141/86   05/02/22 (!) 147/89   02/11/22 (!) 151/90                 Failed - Normal serum creatinine on file in past 12 months     Recent Labs   Lab Test 02/11/22  1207   CR 1.46*       Ok to refill medication if creatinine is low          Passed - Recent (12 mo) or future (30 days) visit within the authorizing provider's specialty     Patient has had an office visit with the authorizing provider or a provider within the authorizing providers department within the previous 12 mos or has a future within next 30 days. See \"Patient Info\" tab in inbasket, or \"Choose Columns\" in Meds & Orders section of the refill encounter.              Passed - Medication is active on med list        Passed - Patient is age 18 or older        Passed - Normal serum potassium on file in past 12 months     Recent Labs   Lab Test 02/11/22  1207   POTASSIUM 4.4                       losartan (COZAAR) 50 MG tablet [Pharmacy Med Name: Losartan Potassium Oral Tablet 50 MG] 90 tablet 0     Sig: Take 1 tablet (50 mg) by mouth once daily       Angiotensin-II Receptors Failed - 6/4/2022  2:00 AM        Failed - Last blood pressure under 140/90 in past 12 months     BP Readings from Last 3 Encounters:   05/18/22 (!) 141/86   05/02/22 (!) 147/89   02/11/22 (!) 151/90                 Failed - Normal serum creatinine on file in past 12 months     " "Recent Labs   Lab Test 02/11/22  1207   CR 1.46*       Ok to refill medication if creatinine is low          Passed - Recent (12 mo) or future (30 days) visit within the authorizing provider's specialty     Patient has had an office visit with the authorizing provider or a provider within the authorizing providers department within the previous 12 mos or has a future within next 30 days. See \"Patient Info\" tab in inbasket, or \"Choose Columns\" in Meds & Orders section of the refill encounter.              Passed - Medication is active on med list        Passed - Patient is age 18 or older        Passed - Normal serum potassium on file in past 12 months     Recent Labs   Lab Test 02/11/22  1207   POTASSIUM 4.4                       amLODIPine (NORVASC) 10 MG tablet [Pharmacy Med Name: amLODIPine Besylate Oral Tablet 10 MG] 90 tablet 0     Sig: Take 1 tablet (10 mg) by mouth once daily       Calcium Channel Blockers Protocol  Failed - 6/4/2022  2:00 AM        Failed - Blood pressure under 140/90 in past 12 months     BP Readings from Last 3 Encounters:   05/18/22 (!) 141/86   05/02/22 (!) 147/89   02/11/22 (!) 151/90                 Failed - Normal serum creatinine on file in past 12 months     Recent Labs   Lab Test 02/11/22  1207   CR 1.46*       Ok to refill medication if creatinine is low          Passed - Recent (12 mo) or future (30 days) visit within the authorizing provider's specialty     Patient has had an office visit with the authorizing provider or a provider within the authorizing providers department within the previous 12 mos or has a future within next 30 days. See \"Patient Info\" tab in inbasket, or \"Choose Columns\" in Meds & Orders section of the refill encounter.              Passed - Medication is active on med list        Passed - Patient is age 18 or older           atenolol (TENORMIN) 100 MG tablet [Pharmacy Med Name: Atenolol Oral Tablet 100 MG] 90 tablet 0     Sig: Take 1 tablet (100 mg) by " "mouth once daily       Beta-Blockers Protocol Failed - 6/4/2022  2:00 AM        Failed - Blood pressure under 140/90 in past 12 months     BP Readings from Last 3 Encounters:   05/18/22 (!) 141/86   05/02/22 (!) 147/89   02/11/22 (!) 151/90                 Passed - Patient is age 6 or older        Passed - Recent (12 mo) or future (30 days) visit within the authorizing provider's specialty     Patient has had an office visit with the authorizing provider or a provider within the authorizing providers department within the previous 12 mos or has a future within next 30 days. See \"Patient Info\" tab in inbasket, or \"Choose Columns\" in Meds & Orders section of the refill encounter.              Passed - Medication is active on med list           simvastatin (ZOCOR) 20 MG tablet [Pharmacy Med Name: Simvastatin Oral Tablet 20 MG] 90 tablet 0     Sig: Take 1 tablet (20 mg) by mouth nightly At Bedtime       Statins Protocol Passed - 6/4/2022  2:00 AM        Passed - LDL on file in past 12 months     Recent Labs   Lab Test 02/11/22  1207                Passed - No abnormal creatine kinase in past 12 months     No lab results found.             Passed - Recent (12 mo) or future (30 days) visit within the authorizing provider's specialty     Patient has had an office visit with the authorizing provider or a provider within the authorizing providers department within the previous 12 mos or has a future within next 30 days. See \"Patient Info\" tab in inbasket, or \"Choose Columns\" in Meds & Orders section of the refill encounter.              Passed - Medication is active on med list        Passed - Patient is age 18 or older             Hiral Berry RN 06/05/22 2:54 PM  "

## 2022-06-06 RX ORDER — LOSARTAN POTASSIUM 100 MG/1
TABLET ORAL
Qty: 90 TABLET | Refills: 0 | Status: SHIPPED | OUTPATIENT
Start: 2022-06-06 | End: 2022-09-02

## 2022-06-06 RX ORDER — ATENOLOL 100 MG/1
TABLET ORAL
Qty: 90 TABLET | Refills: 0 | Status: SHIPPED | OUTPATIENT
Start: 2022-06-06 | End: 2022-09-02

## 2022-06-06 RX ORDER — AMLODIPINE BESYLATE 10 MG/1
TABLET ORAL
Qty: 90 TABLET | Refills: 0 | Status: SHIPPED | OUTPATIENT
Start: 2022-06-06 | End: 2022-09-02

## 2022-06-06 RX ORDER — LOSARTAN POTASSIUM 50 MG/1
TABLET ORAL
Qty: 90 TABLET | Refills: 0 | Status: SHIPPED | OUTPATIENT
Start: 2022-06-06 | End: 2022-09-02

## 2022-06-06 RX ORDER — SIMVASTATIN 20 MG
TABLET ORAL
Qty: 90 TABLET | Refills: 0 | Status: SHIPPED | OUTPATIENT
Start: 2022-06-06 | End: 2022-09-02

## 2022-08-03 ENCOUNTER — TELEPHONE (OUTPATIENT)
Dept: FAMILY MEDICINE | Facility: CLINIC | Age: 62
End: 2022-08-03

## 2022-08-03 NOTE — TELEPHONE ENCOUNTER
Pt contacted clinic for refill of clonidine 0.2 mg tablet bid. Pt notified a 90 day supply with 1 refill was sent to [facility] pharmacy on 5/19/22 and to contact them. Pt will call back if further questions.

## 2022-09-02 DIAGNOSIS — I10 ESSENTIAL HYPERTENSION: ICD-10-CM

## 2022-09-02 DIAGNOSIS — E78.2 MIXED HYPERLIPIDEMIA: ICD-10-CM

## 2022-09-02 RX ORDER — LOSARTAN POTASSIUM 100 MG/1
TABLET ORAL
Qty: 90 TABLET | Refills: 1 | Status: SHIPPED | OUTPATIENT
Start: 2022-09-02 | End: 2023-03-03

## 2022-09-02 RX ORDER — SIMVASTATIN 20 MG
TABLET ORAL
Qty: 90 TABLET | Refills: 1 | Status: SHIPPED | OUTPATIENT
Start: 2022-09-02 | End: 2023-01-24

## 2022-09-02 RX ORDER — ATENOLOL 100 MG/1
TABLET ORAL
Qty: 90 TABLET | Refills: 1 | Status: SHIPPED | OUTPATIENT
Start: 2022-09-02 | End: 2023-03-03

## 2022-09-02 RX ORDER — AMLODIPINE BESYLATE 10 MG/1
TABLET ORAL
Qty: 90 TABLET | Refills: 1 | Status: SHIPPED | OUTPATIENT
Start: 2022-09-02 | End: 2023-03-03

## 2022-09-02 RX ORDER — LOSARTAN POTASSIUM 50 MG/1
TABLET ORAL
Qty: 90 TABLET | Refills: 1 | Status: SHIPPED | OUTPATIENT
Start: 2022-09-02 | End: 2023-03-03

## 2022-09-02 NOTE — TELEPHONE ENCOUNTER
"Routing refill request to provider for review/approval because:  Labs out of range:  BP, Creatinine, drug interaction warning simvastatin-amlodipine      Last OV 2/11/2022           Requested Prescriptions   Pending Prescriptions Disp Refills     losartan (COZAAR) 100 MG tablet [Pharmacy Med Name: Losartan Potassium Oral Tablet 100 MG] 90 tablet 0     Sig: Take 1 tablet (100 mg) by mouth once daily       Angiotensin-II Receptors Failed - 9/2/2022  2:00 AM        Failed - Last blood pressure under 140/90 in past 12 months     BP Readings from Last 3 Encounters:   05/18/22 (!) 141/86   05/02/22 (!) 147/89   02/11/22 (!) 151/90                 Failed - Normal serum creatinine on file in past 12 months     Recent Labs   Lab Test 02/11/22  1207   CR 1.46*       Ok to refill medication if creatinine is low          Passed - Recent (12 mo) or future (30 days) visit within the authorizing provider's specialty     Patient has had an office visit with the authorizing provider or a provider within the authorizing providers department within the previous 12 mos or has a future within next 30 days. See \"Patient Info\" tab in inbasket, or \"Choose Columns\" in Meds & Orders section of the refill encounter.              Passed - Medication is active on med list        Passed - Patient is age 18 or older        Passed - Normal serum potassium on file in past 12 months     Recent Labs   Lab Test 02/11/22  1207   POTASSIUM 4.4                       losartan (COZAAR) 50 MG tablet [Pharmacy Med Name: Losartan Potassium Oral Tablet 50 MG] 90 tablet 0     Sig: Take 1 tablet (50 mg) by mouth once daily       Angiotensin-II Receptors Failed - 9/2/2022  2:00 AM        Failed - Last blood pressure under 140/90 in past 12 months     BP Readings from Last 3 Encounters:   05/18/22 (!) 141/86   05/02/22 (!) 147/89   02/11/22 (!) 151/90                 Failed - Normal serum creatinine on file in past 12 months     Recent Labs   Lab Test " "02/11/22  1207   CR 1.46*       Ok to refill medication if creatinine is low          Passed - Recent (12 mo) or future (30 days) visit within the authorizing provider's specialty     Patient has had an office visit with the authorizing provider or a provider within the authorizing providers department within the previous 12 mos or has a future within next 30 days. See \"Patient Info\" tab in inbasket, or \"Choose Columns\" in Meds & Orders section of the refill encounter.              Passed - Medication is active on med list        Passed - Patient is age 18 or older        Passed - Normal serum potassium on file in past 12 months     Recent Labs   Lab Test 02/11/22  1207   POTASSIUM 4.4                       amLODIPine (NORVASC) 10 MG tablet [Pharmacy Med Name: amLODIPine Besylate Oral Tablet 10 MG] 90 tablet 0     Sig: Take 1 tablet (10 mg) by mouth once daily       Calcium Channel Blockers Protocol  Failed - 9/2/2022  2:00 AM        Failed - Blood pressure under 140/90 in past 12 months     BP Readings from Last 3 Encounters:   05/18/22 (!) 141/86   05/02/22 (!) 147/89   02/11/22 (!) 151/90                 Failed - Normal serum creatinine on file in past 12 months     Recent Labs   Lab Test 02/11/22  1207   CR 1.46*       Ok to refill medication if creatinine is low          Passed - Recent (12 mo) or future (30 days) visit within the authorizing provider's specialty     Patient has had an office visit with the authorizing provider or a provider within the authorizing providers department within the previous 12 mos or has a future within next 30 days. See \"Patient Info\" tab in inbasket, or \"Choose Columns\" in Meds & Orders section of the refill encounter.              Passed - Medication is active on med list        Passed - Patient is age 18 or older           atenolol (TENORMIN) 100 MG tablet [Pharmacy Med Name: Atenolol Oral Tablet 100 MG] 90 tablet 0     Sig: Take 1 tablet (100 mg) by mouth once daily       " "Beta-Blockers Protocol Failed - 9/2/2022  2:00 AM        Failed - Blood pressure under 140/90 in past 12 months     BP Readings from Last 3 Encounters:   05/18/22 (!) 141/86   05/02/22 (!) 147/89   02/11/22 (!) 151/90                 Passed - Patient is age 6 or older        Passed - Recent (12 mo) or future (30 days) visit within the authorizing provider's specialty     Patient has had an office visit with the authorizing provider or a provider within the authorizing providers department within the previous 12 mos or has a future within next 30 days. See \"Patient Info\" tab in inbasket, or \"Choose Columns\" in Meds & Orders section of the refill encounter.              Passed - Medication is active on med list           simvastatin (ZOCOR) 20 MG tablet [Pharmacy Med Name: Simvastatin Oral Tablet 20 MG] 90 tablet 0     Sig: Take 1 tablet (20 mg) by mouth nightly At Bedtime       Statins Protocol Passed - 9/2/2022  2:00 AM        Passed - LDL on file in past 12 months     Recent Labs   Lab Test 02/11/22  1207                Passed - No abnormal creatine kinase in past 12 months     No lab results found.             Passed - Recent (12 mo) or future (30 days) visit within the authorizing provider's specialty     Patient has had an office visit with the authorizing provider or a provider within the authorizing providers department within the previous 12 mos or has a future within next 30 days. See \"Patient Info\" tab in inbasket, or \"Choose Columns\" in Meds & Orders section of the refill encounter.              Passed - Medication is active on med list        Passed - Patient is age 18 or older             Judah Hood 09/02/22 2:47 PM  "

## 2022-12-28 DIAGNOSIS — I10 ESSENTIAL HYPERTENSION: ICD-10-CM

## 2022-12-29 RX ORDER — HYDRALAZINE HYDROCHLORIDE 50 MG/1
TABLET, FILM COATED ORAL
Qty: 180 TABLET | Refills: 0 | Status: SHIPPED | OUTPATIENT
Start: 2022-12-29 | End: 2023-03-31

## 2022-12-29 NOTE — TELEPHONE ENCOUNTER
"Routing refill request to provider for review/approval because:  Blood pressure failed     Last Written Prescription Date:  5/19/2022  Last Fill Quantity: 180,  # refills: 1   Last office visit provider:  2/11/2022     Requested Prescriptions   Pending Prescriptions Disp Refills     hydrALAZINE (APRESOLINE) 50 MG tablet [Pharmacy Med Name: hydrALAZINE HCl Oral Tablet 50 MG] 180 tablet 0     Sig: Take 1 tablet (50 mg) by mouth 2 times daily (before a meal)       Vasodilators Failed - 12/29/2022 12:52 PM        Failed - Most recent BP less than 140/90 on record     BP Readings from Last 3 Encounters:   05/18/22 (!) 141/86   05/02/22 (!) 147/89   02/11/22 (!) 151/90                 Passed - Most recent encounter is not a hospital encounter. Patient has recent (12 mos) or future (1 mos) visit with authorizing provider's specialty     Patient's most recent encounter is NOT a hospital encounter and has had an office visit in the last 12 months or has a visit in the next 30 days with authorizing provider or within the authorizing provider's specialty.      See \"Patient Info\" tab in inbasket, or \"Choose Columns\" in Meds & Orders section of the refill encounter.      If most recent encounter is a hospital encounter AND the patient does NOT have an appointment scheduled with the authorizing provider or authorizing provider's specialty within the next 30 days, forward refill to authorizing provider for medication review.          Passed - Medication is active on med list        Passed - Patient is of age 18 years or older             Modesta Cárdenas RN 12/29/22 12:52 PM  "

## 2023-01-17 ENCOUNTER — TELEPHONE (OUTPATIENT)
Dept: FAMILY MEDICINE | Facility: CLINIC | Age: 63
End: 2023-01-17
Payer: COMMERCIAL

## 2023-01-17 NOTE — TELEPHONE ENCOUNTER
Reason for call:  Other   Patient called regarding (reason for call): appointment  Additional comments: Pt was discharged from Hospital 1/17, broken wrist, had screw placed, work accident.  Needs follow up with PCP on or before 1/24    Phone number to reach patient:  Cell number on file:    Telephone Information:   Mobile [phone]       Best Time: any    Can we leave a detailed message on this number?  YES    Travel screening: Negative

## 2023-01-17 NOTE — TELEPHONE ENCOUNTER
Dr. Laureano,    Patient needs hospital follow up following crush injury and surgical repair of right hand 1/16/23.    You schedule does not have any openings,    Please advise,    Judah Hood RN     [facility]

## 2023-01-24 ENCOUNTER — OFFICE VISIT (OUTPATIENT)
Dept: FAMILY MEDICINE | Facility: CLINIC | Age: 63
End: 2023-01-24
Payer: OTHER MISCELLANEOUS

## 2023-01-24 VITALS
OXYGEN SATURATION: 99 % | HEIGHT: 67 IN | HEART RATE: 61 BPM | RESPIRATION RATE: 16 BRPM | WEIGHT: 245.2 LBS | BODY MASS INDEX: 38.48 KG/M2 | SYSTOLIC BLOOD PRESSURE: 141 MMHG | DIASTOLIC BLOOD PRESSURE: 76 MMHG

## 2023-01-24 DIAGNOSIS — S69.91XD INJURY OF RIGHT HAND, SUBSEQUENT ENCOUNTER: Primary | ICD-10-CM

## 2023-01-24 DIAGNOSIS — I10 ESSENTIAL HYPERTENSION: ICD-10-CM

## 2023-01-24 DIAGNOSIS — E66.01 MORBID OBESITY (H): ICD-10-CM

## 2023-01-24 DIAGNOSIS — N18.31 STAGE 3A CHRONIC KIDNEY DISEASE (H): ICD-10-CM

## 2023-01-24 PROBLEM — S67.21XA CRUSHING INJURY OF RIGHT HAND, INITIAL ENCOUNTER: Status: ACTIVE | Noted: 2023-01-16

## 2023-01-24 PROCEDURE — 99213 OFFICE O/P EST LOW 20 MIN: CPT | Performed by: FAMILY MEDICINE

## 2023-01-24 RX ORDER — CEPHALEXIN 500 MG/1
CAPSULE ORAL
COMMUNITY
Start: 2023-01-17 | End: 2023-05-01

## 2023-01-24 NOTE — PROGRESS NOTES
"  Assessment & Plan     ICD-10-CM    1. Injury of right hand, subsequent encounter  S69.91XD       2. Morbid obesity (H)  E66.01       3. Stage 3a chronic kidney disease (H)  N18.31       4. Hypertension  I10         Medical decision making: Patient with right hand injury sustained on 1/16/2023 and subsequently had surgery done for repair of third metacarpal.  Patient was discharged home on 1/17/2023.  He was discharged on antibiotic Keflex.  He has a dressing on today and incision looks clear without any swelling, erythema or discharge.  His distal finger motion is normal.  He is seeing the orthopedic surgeon tomorrow for follow-up.    Best practice advisory mentions morbid obesity complicated with hypertension.  Blood pressure is stable.  Is on quadruple therapy.  Continue lifestyle modification.  Best practice advisory mentions stage III CKD.  Continue to monitor.     MED REC REQUIRED  Post Medication Reconciliation Status:  Discharge medications reconciled, continue medications without change    BMI:   Estimated body mass index is 38.4 kg/m  as calculated from the following:    Height as of this encounter: 1.702 m (5' 7\").    Weight as of this encounter: 111.2 kg (245 lb 3.2 oz).   Weight management plan: Discussed healthy diet and exercise guidelines    MEDICATIONS:  Continue current medications without change    Return in about 3 months (around 4/24/2023) for Routine preventive.    Julian Dominguez MD  [facility]    Scott Moreno is a 62 year old accompanied by his N/A, presenting for the following health issues:  Hospital F/U (Hospital follow up- [place], pt had an accident at work with right hand. Pt is in NO pain. Pt is fasting today. )      HPI: Feels well.  He is back to work and used Saran wrap 1 day over his injury.  However, he felt sweaty in his palms.        Hospital Follow-up Visit:    Hospital/Nursing Home/IP Rehab Facility: [facility]   Date " "of Admission: 01/16/2023  Date of Discharge: 01/17/2023  Reason(s) for Admission: Accident at work     Was your hospitalization related to COVID-19? No   Problems taking medications regularly:  None  Medication changes since discharge: Pain med- pt is NOT taking, also prescribed an antibiotic pt is currently taking.  Problems adhering to non-medication therapy:  None    Summary of hospitalization:  CareEverywhere information obtained and reviewed  Diagnostic Tests/Treatments reviewed.  Follow up needed: With orthopedic  Other Healthcare Providers Involved in Patient s Care:         Specialist appointment - 1/25/2023 with orthopedic at [facility]  Update since discharge: improved.         Plan of care communicated with patient           Patient Active Problem List   Diagnosis     Hyperlipidemia     Hypertension     Benign Adenomatous Polyp Of The Large Intestine     Essential Hypertriglyceridemia     Chronic Kidney Disease, Stage 3     Obesity (BMI 35.0-39.9) with comorbidity (H)     Crushing injury of right hand, initial encounter     Current Outpatient Medications   Medication     amLODIPine (NORVASC) 10 MG tablet     aspirin (ASA) 81 MG EC tablet     atenolol (TENORMIN) 100 MG tablet     cloNIDine (CATAPRES) 0.2 MG tablet     hydrALAZINE (APRESOLINE) 50 MG tablet     losartan (COZAAR) 100 MG tablet     losartan (COZAAR) 50 MG tablet     MULTIVITAMIN (MULTIPLE VITAMIN ORAL)     simvastatin (ZOCOR) 40 MG tablet     cephALEXin (KEFLEX) 500 MG capsule     No current facility-administered medications for this visit.       Review of Systems   Constitutional, HEENT, cardiovascular, pulmonary, gi and gu systems are negative, except as otherwise noted.      Objective    BP (!) 141/76   Pulse 61   Resp 16   Ht 1.702 m (5' 7\")   Wt 111.2 kg (245 lb 3.2 oz)   SpO2 99%   BMI 38.40 kg/m    Body mass index is 38.4 kg/m .  Physical Exam   GENERAL: healthy, alert and no distress  NECK: no adenopathy, no asymmetry, masses, or " scars and thyroid normal to palpation  RESP: lungs clear to auscultation - no rales, rhonchi or wheezes  CV: regular rate and rhythm, normal S1 S2, no S3 or S4, no murmur, click or rub, no peripheral edema and peripheral pulses strong  MS: Dressings and wrapped and 2 incisions well approximated and healing.  No surrounding erythema.  Minimal tenderness.  Range of motion at distal finger is maintained.

## 2023-01-25 ENCOUNTER — TRANSFERRED RECORDS (OUTPATIENT)
Dept: HEALTH INFORMATION MANAGEMENT | Facility: CLINIC | Age: 63
End: 2023-01-25

## 2023-01-25 DIAGNOSIS — I10 ESSENTIAL HYPERTENSION: ICD-10-CM

## 2023-01-25 NOTE — TELEPHONE ENCOUNTER
"Routing refill request to provider for review/approval because:  Labs out of range:  Cr, bp    Last Written Prescription Date:  5/19/22  Last Fill Quantity: 180,  # refills: 1   Last office visit provider:  1/24/23     Requested Prescriptions   Pending Prescriptions Disp Refills     cloNIDine (CATAPRES) 0.2 MG tablet [Pharmacy Med Name: cloNIDine HCl Oral Tablet 0.2 MG] 180 tablet 0     Sig: Take 1 tablet (0.2 mg) by mouth 2 times daily       Central Acting Antiadrenergic Agents Failed - 1/25/2023  2:00 AM        Failed - Blood pressure under 140/90 in past 12 months     BP Readings from Last 3 Encounters:   01/24/23 (!) 141/76   05/18/22 (!) 141/86 05/02/22 (!) 147/89                 Failed - Normal serum creatinine on file within past 12 months     Recent Labs   Lab Test 02/11/22  1207   CR 1.46*       Ok to refill medication if creatinine is low          Passed - Patient is 6 years of age or older        Passed - Recent (12 mo) or future (30 days) visit within the authorizing provider's specialty     Patient has had an office visit with the authorizing provider or a provider within the authorizing providers department within the previous 12 mos or has a future within next 30 days. See \"Patient Info\" tab in inbasket, or \"Choose Columns\" in Meds & Orders section of the refill encounter.              Passed - Medication is active on med list       Antiadrenergic Antihypertensives Failed - 1/25/2023  2:00 AM        Failed - Blood pressure less than 140/90 in past 6 months     BP Readings from Last 3 Encounters:   01/24/23 (!) 141/76   05/18/22 (!) 141/86   05/02/22 (!) 147/89                 Failed - Normal serum creatinine on file in past 12 months     Recent Labs   Lab Test 02/11/22  1207   CR 1.46*       Ok to refill medication if creatinine is low          Failed - Recent (6 mo) or future (30 days) visit within the authorizing provider's specialty     Patient had office visit in the last 6 months or has a visit " "in the next 30 days with authorizing provider or within the authorizing provider's specialty.  See \"Patient Info\" tab in inbasket, or \"Choose Columns\" in Meds & Orders section of the refill encounter.            Passed - Medication is active on med list        Passed - Patient is age 18 or older             Hiral Berry RN 01/25/23 5:16 PM  "

## 2023-01-26 RX ORDER — CLONIDINE HYDROCHLORIDE 0.2 MG/1
0.2 TABLET ORAL 2 TIMES DAILY
Qty: 180 TABLET | Refills: 0 | Status: SHIPPED | OUTPATIENT
Start: 2023-01-26 | End: 2023-05-05

## 2023-02-02 ENCOUNTER — TRANSFERRED RECORDS (OUTPATIENT)
Dept: HEALTH INFORMATION MANAGEMENT | Facility: CLINIC | Age: 63
End: 2023-02-02

## 2023-03-03 DIAGNOSIS — I10 ESSENTIAL HYPERTENSION: ICD-10-CM

## 2023-03-03 RX ORDER — LOSARTAN POTASSIUM 100 MG/1
TABLET ORAL
Qty: 90 TABLET | Refills: 0 | Status: SHIPPED | OUTPATIENT
Start: 2023-03-03 | End: 2023-06-02

## 2023-03-03 RX ORDER — AMLODIPINE BESYLATE 10 MG/1
TABLET ORAL
Qty: 90 TABLET | Refills: 0 | Status: SHIPPED | OUTPATIENT
Start: 2023-03-03 | End: 2023-06-02

## 2023-03-03 RX ORDER — LOSARTAN POTASSIUM 50 MG/1
TABLET ORAL
Qty: 90 TABLET | Refills: 0 | Status: SHIPPED | OUTPATIENT
Start: 2023-03-03 | End: 2023-06-02

## 2023-03-03 RX ORDER — ATENOLOL 100 MG/1
TABLET ORAL
Qty: 90 TABLET | Refills: 0 | Status: SHIPPED | OUTPATIENT
Start: 2023-03-03 | End: 2023-06-02

## 2023-03-03 NOTE — TELEPHONE ENCOUNTER
"Routing refill request to provider for review/approval because:  Labs out of range:  Cr, k, na, bp    Last Written Prescription Date:  9/2/22  Last Fill Quantity: 90,  # refills: 01   Last office visit provider:  1/24/23     Requested Prescriptions   Pending Prescriptions Disp Refills     amLODIPine (NORVASC) 10 MG tablet [Pharmacy Med Name: amLODIPine Besylate Oral Tablet 10 MG] 90 tablet 0     Sig: Take 1 tablet by mouth once daily       Calcium Channel Blockers Protocol  Failed - 3/3/2023  6:20 AM        Failed - Blood pressure under 140/90 in past 12 months     BP Readings from Last 3 Encounters:   01/24/23 (!) 141/76   05/18/22 (!) 141/86   05/02/22 (!) 147/89                 Failed - Normal serum creatinine on file in past 12 months     Recent Labs   Lab Test 02/11/22  1207   CR 1.46*       Ok to refill medication if creatinine is low          Passed - Recent (12 mo) or future (30 days) visit within the authorizing provider's specialty     Patient has had an office visit with the authorizing provider or a provider within the authorizing providers department within the previous 12 mos or has a future within next 30 days. See \"Patient Info\" tab in inbasket, or \"Choose Columns\" in Meds & Orders section of the refill encounter.              Passed - Medication is active on med list        Passed - Patient is age 18 or older           losartan (COZAAR) 50 MG tablet [Pharmacy Med Name: Losartan Potassium Oral Tablet 50 MG] 90 tablet 0     Sig: Take 1 tablet (50 mg) by mouth once daily       Angiotensin-II Receptors Failed - 3/3/2023  6:20 AM        Failed - Last blood pressure under 140/90 in past 12 months     BP Readings from Last 3 Encounters:   01/24/23 (!) 141/76   05/18/22 (!) 141/86   05/02/22 (!) 147/89                 Failed - Normal serum creatinine on file in past 12 months     Recent Labs   Lab Test 02/11/22  1207   CR 1.46*       Ok to refill medication if creatinine is low          Failed - Normal " "serum potassium on file in past 12 months     Recent Labs   Lab Test 02/11/22  1207   POTASSIUM 4.4                    Passed - Recent (12 mo) or future (30 days) visit within the authorizing provider's specialty     Patient has had an office visit with the authorizing provider or a provider within the authorizing providers department within the previous 12 mos or has a future within next 30 days. See \"Patient Info\" tab in inbasket, or \"Choose Columns\" in Meds & Orders section of the refill encounter.              Passed - Medication is active on med list        Passed - Patient is age 18 or older           losartan (COZAAR) 100 MG tablet [Pharmacy Med Name: Losartan Potassium Oral Tablet 100 MG] 90 tablet 0     Sig: Take 1 tablet (100 mg) by mouth once daily       Angiotensin-II Receptors Failed - 3/3/2023  6:20 AM        Failed - Last blood pressure under 140/90 in past 12 months     BP Readings from Last 3 Encounters:   01/24/23 (!) 141/76   05/18/22 (!) 141/86   05/02/22 (!) 147/89                 Failed - Normal serum creatinine on file in past 12 months     Recent Labs   Lab Test 02/11/22  1207   CR 1.46*       Ok to refill medication if creatinine is low          Failed - Normal serum potassium on file in past 12 months     Recent Labs   Lab Test 02/11/22  1207   POTASSIUM 4.4                    Passed - Recent (12 mo) or future (30 days) visit within the authorizing provider's specialty     Patient has had an office visit with the authorizing provider or a provider within the authorizing providers department within the previous 12 mos or has a future within next 30 days. See \"Patient Info\" tab in inbasket, or \"Choose Columns\" in Meds & Orders section of the refill encounter.              Passed - Medication is active on med list        Passed - Patient is age 18 or older           atenolol (TENORMIN) 100 MG tablet [Pharmacy Med Name: Atenolol Oral Tablet 100 MG] 90 tablet 0     Sig: Take 1 tablet (100 mg) by " "mouth once daily       Beta-Blockers Protocol Failed - 3/3/2023  6:20 AM        Failed - Blood pressure under 140/90 in past 12 months     BP Readings from Last 3 Encounters:   01/24/23 (!) 141/76   05/18/22 (!) 141/86   05/02/22 (!) 147/89                 Passed - Patient is age 6 or older        Passed - Recent (12 mo) or future (30 days) visit within the authorizing provider's specialty     Patient has had an office visit with the authorizing provider or a provider within the authorizing providers department within the previous 12 mos or has a future within next 30 days. See \"Patient Info\" tab in inbasket, or \"Choose Columns\" in Meds & Orders section of the refill encounter.              Passed - Medication is active on med list             Hiral Berry RN 03/03/23 3:49 PM  "

## 2023-03-10 ENCOUNTER — TRANSFERRED RECORDS (OUTPATIENT)
Dept: HEALTH INFORMATION MANAGEMENT | Facility: CLINIC | Age: 63
End: 2023-03-10

## 2023-03-31 DIAGNOSIS — I10 ESSENTIAL HYPERTENSION: ICD-10-CM

## 2023-03-31 RX ORDER — HYDRALAZINE HYDROCHLORIDE 50 MG/1
TABLET, FILM COATED ORAL
Qty: 180 TABLET | Refills: 0 | Status: SHIPPED | OUTPATIENT
Start: 2023-03-31 | End: 2023-08-09

## 2023-03-31 NOTE — TELEPHONE ENCOUNTER
"Routing refill request to provider for review/approval because:  Labs out of range:  BP: 141/76 on 1/24/23    Last Written Prescription Date:  12/29/22  Last Fill Quantity: 180,  # refills: 0   Last office visit provider:  1/24/23, Dr. Dominguez     Requested Prescriptions   Pending Prescriptions Disp Refills     hydrALAZINE (APRESOLINE) 50 MG tablet [Pharmacy Med Name: hydrALAZINE HCl Oral Tablet 50 MG] 180 tablet 0     Sig: Take 1 tablet  by mouth 2 times daily before a meal.       Vasodilators Failed - 3/31/2023  4:09 AM        Failed - Most recent BP less than 140/90 on record     BP Readings from Last 3 Encounters:   01/24/23 (!) 141/76   05/18/22 (!) 141/86   05/02/22 (!) 147/89                 Passed - Most recent encounter is not a hospital encounter. Patient has recent (12 mos) or future (1 mos) visit with authorizing provider's specialty     Patient's most recent encounter is NOT a hospital encounter and has had an office visit in the last 12 months or has a visit in the next 30 days with authorizing provider or within the authorizing provider's specialty.      See \"Patient Info\" tab in inbasket, or \"Choose Columns\" in Meds & Orders section of the refill encounter.      If most recent encounter is a hospital encounter AND the patient does NOT have an appointment scheduled with the authorizing provider or authorizing provider's specialty within the next 30 days, forward refill to authorizing provider for medication review.          Passed - Medication is active on med list        Passed - Patient is of age 18 years or older             Stacey Middleton RN 03/31/23 1:59 PM  "

## 2023-04-12 DIAGNOSIS — E78.2 MIXED HYPERLIPIDEMIA: ICD-10-CM

## 2023-04-13 NOTE — TELEPHONE ENCOUNTER
"Routing refill request to provider for review/approval because:  Labs not current:  Last LP 2/11/22    Last Written Prescription Date:  2/14/22  Last Fill Quantity: 90,  # refills: 3   Last office visit provider:  1/24/23     Requested Prescriptions   Pending Prescriptions Disp Refills     simvastatin (ZOCOR) 40 MG tablet [Pharmacy Med Name: Simvastatin Oral Tablet 40 MG] 90 tablet 0     Sig: Take 1 tablet (40 mg) by mouth At Bedtime       Statins Protocol Failed - 4/12/2023  8:30 PM        Failed - LDL on file in past 12 months     Recent Labs   Lab Test 02/11/22  1207                Passed - No abnormal creatine kinase in past 12 months     No lab results found.             Passed - Recent (12 mo) or future (30 days) visit within the authorizing provider's specialty     Patient has had an office visit with the authorizing provider or a provider within the authorizing providers department within the previous 12 mos or has a future within next 30 days. See \"Patient Info\" tab in inbasket, or \"Choose Columns\" in Meds & Orders section of the refill encounter.              Passed - Medication is active on med list        Passed - Patient is age 18 or older             MAGO BUCHANAN RN 04/13/23 1:50 PM  "

## 2023-04-14 RX ORDER — SIMVASTATIN 40 MG
40 TABLET ORAL AT BEDTIME
Qty: 90 TABLET | Refills: 0 | Status: SHIPPED | OUTPATIENT
Start: 2023-04-14 | End: 2023-07-26

## 2023-04-24 ENCOUNTER — TRANSFERRED RECORDS (OUTPATIENT)
Dept: HEALTH INFORMATION MANAGEMENT | Facility: CLINIC | Age: 63
End: 2023-04-24
Payer: COMMERCIAL

## 2023-05-01 ENCOUNTER — OFFICE VISIT (OUTPATIENT)
Dept: FAMILY MEDICINE | Facility: CLINIC | Age: 63
End: 2023-05-01
Payer: COMMERCIAL

## 2023-05-01 VITALS
BODY MASS INDEX: 37.83 KG/M2 | DIASTOLIC BLOOD PRESSURE: 80 MMHG | HEART RATE: 63 BPM | WEIGHT: 241 LBS | OXYGEN SATURATION: 97 % | RESPIRATION RATE: 20 BRPM | HEIGHT: 67 IN | TEMPERATURE: 98.5 F | SYSTOLIC BLOOD PRESSURE: 155 MMHG

## 2023-05-01 DIAGNOSIS — N18.32 STAGE 3B CHRONIC KIDNEY DISEASE (H): ICD-10-CM

## 2023-05-01 DIAGNOSIS — Z00.00 HEALTH CARE MAINTENANCE: Primary | ICD-10-CM

## 2023-05-01 DIAGNOSIS — N18.31 STAGE 3A CHRONIC KIDNEY DISEASE (H): ICD-10-CM

## 2023-05-01 DIAGNOSIS — Z12.5 SCREENING FOR PROSTATE CANCER: ICD-10-CM

## 2023-05-01 DIAGNOSIS — E78.1 PURE HYPERGLYCERIDEMIA: ICD-10-CM

## 2023-05-01 LAB
ALBUMIN SERPL BCG-MCNC: 4.4 G/DL (ref 3.5–5.2)
ALP SERPL-CCNC: 58 U/L (ref 40–129)
ALT SERPL W P-5'-P-CCNC: 35 U/L (ref 10–50)
ANION GAP SERPL CALCULATED.3IONS-SCNC: 13 MMOL/L (ref 7–15)
AST SERPL W P-5'-P-CCNC: 45 U/L (ref 10–50)
BILIRUB SERPL-MCNC: 0.8 MG/DL
BUN SERPL-MCNC: 18.6 MG/DL (ref 8–23)
CALCIUM SERPL-MCNC: 9.4 MG/DL (ref 8.8–10.2)
CHLORIDE SERPL-SCNC: 105 MMOL/L (ref 98–107)
CHOLEST SERPL-MCNC: 163 MG/DL
CREAT SERPL-MCNC: 1.36 MG/DL (ref 0.67–1.17)
CREAT UR-MCNC: 82.4 MG/DL
DEPRECATED HCO3 PLAS-SCNC: 22 MMOL/L (ref 22–29)
ERYTHROCYTE [DISTWIDTH] IN BLOOD BY AUTOMATED COUNT: 13 % (ref 10–15)
GFR SERPL CREATININE-BSD FRML MDRD: 58 ML/MIN/1.73M2
GLUCOSE SERPL-MCNC: 79 MG/DL (ref 70–99)
HCT VFR BLD AUTO: 43.5 % (ref 40–53)
HDLC SERPL-MCNC: 47 MG/DL
HGB BLD-MCNC: 14.7 G/DL (ref 13.3–17.7)
LDLC SERPL CALC-MCNC: 94 MG/DL
MCH RBC QN AUTO: 30.7 PG (ref 26.5–33)
MCHC RBC AUTO-ENTMCNC: 33.8 G/DL (ref 31.5–36.5)
MCV RBC AUTO: 91 FL (ref 78–100)
MICROALBUMIN UR-MCNC: <12 MG/L
MICROALBUMIN/CREAT UR: NORMAL MG/G{CREAT}
NONHDLC SERPL-MCNC: 116 MG/DL
PLATELET # BLD AUTO: 183 10E3/UL (ref 150–450)
POTASSIUM SERPL-SCNC: 3.6 MMOL/L (ref 3.4–5.3)
PROT SERPL-MCNC: 7.5 G/DL (ref 6.4–8.3)
PSA SERPL DL<=0.01 NG/ML-MCNC: 3.05 NG/ML (ref 0–4.5)
RBC # BLD AUTO: 4.79 10E6/UL (ref 4.4–5.9)
SODIUM SERPL-SCNC: 140 MMOL/L (ref 136–145)
TRIGL SERPL-MCNC: 112 MG/DL
WBC # BLD AUTO: 8.8 10E3/UL (ref 4–11)

## 2023-05-01 PROCEDURE — 36415 COLL VENOUS BLD VENIPUNCTURE: CPT | Performed by: FAMILY MEDICINE

## 2023-05-01 PROCEDURE — 80053 COMPREHEN METABOLIC PANEL: CPT | Performed by: FAMILY MEDICINE

## 2023-05-01 PROCEDURE — G0103 PSA SCREENING: HCPCS | Performed by: FAMILY MEDICINE

## 2023-05-01 PROCEDURE — 85027 COMPLETE CBC AUTOMATED: CPT | Performed by: FAMILY MEDICINE

## 2023-05-01 PROCEDURE — 99213 OFFICE O/P EST LOW 20 MIN: CPT | Mod: 25 | Performed by: FAMILY MEDICINE

## 2023-05-01 PROCEDURE — 99396 PREV VISIT EST AGE 40-64: CPT | Performed by: FAMILY MEDICINE

## 2023-05-01 PROCEDURE — 82570 ASSAY OF URINE CREATININE: CPT | Performed by: FAMILY MEDICINE

## 2023-05-01 PROCEDURE — 82043 UR ALBUMIN QUANTITATIVE: CPT | Performed by: FAMILY MEDICINE

## 2023-05-01 PROCEDURE — 80061 LIPID PANEL: CPT | Performed by: FAMILY MEDICINE

## 2023-05-01 ASSESSMENT — ENCOUNTER SYMPTOMS
ABDOMINAL PAIN: 0
CHILLS: 0
ARTHRALGIAS: 0
DYSURIA: 0
NAUSEA: 0
JOINT SWELLING: 0
MYALGIAS: 0
WEAKNESS: 0
HEADACHES: 0
SHORTNESS OF BREATH: 0
HEMATOCHEZIA: 0
PALPITATIONS: 0
EYE PAIN: 0
HEARTBURN: 0
FREQUENCY: 0
DIARRHEA: 0
COUGH: 0
FEVER: 0
HEMATURIA: 0
CONSTIPATION: 0
PARESTHESIAS: 0
DIZZINESS: 0
NERVOUS/ANXIOUS: 0
SORE THROAT: 0

## 2023-05-01 NOTE — PROGRESS NOTES
SUBJECTIVE:   CC: Josh is an 62 year old who presents for preventative health visit.       5/1/2023     4:22 PM   Additional Questions   Roomed by Rocío BARRY CMA   Patient has been advised of split billing requirements and indicates understanding: Yes  Healthy Habits:     Getting at least 3 servings of Calcium per day:  Yes    Bi-annual eye exam:  Yes    Dental care twice a year:  NO    Sleep apnea or symptoms of sleep apnea:  None    Diet:  Regular (no restrictions)    Frequency of exercise:  2-3 days/week    Duration of exercise:  15-30 minutes    Taking medications regularly:  Yes    Medication side effects:  None    PHQ-2 Total Score: 0    Additional concerns today:  No    Patient is here for annual exam.  Patient has a history of known chronic kidney disease stage III his blood pressures not at goal range today he is on multiple medications-patient had tried slightly higher dosing on clonidine to lower blood pressure was having hypotensive episodes.  He feels his blood pressures been running at normal at home and does not want make adjustments today he like to monitor and recheck blood pressure in a couple weeks.  He is fasting interested in fasting blood work.  Has a history of high triglycerides which will be checking lipid levels today.  Continue to screen for prostate cancer with PSA.  Up-to-date on immunizations.  Patient be due for colon cancer screening next year.    Today's PHQ-2 Score:       5/1/2023     4:00 PM   PHQ-2 ( 1999 [employer])   Q1: Little interest or pleasure in doing things 0   Q2: Feeling down, depressed or hopeless 0   PHQ-2 Score 0   Q1: Little interest or pleasure in doing things Not at all   Q2: Feeling down, depressed or hopeless Not at all   PHQ-2 Score 0           Social History     Tobacco Use     Smoking status: Never     Smokeless tobacco: Never   Vaping Use     Vaping status: Not on file   Substance Use Topics     Alcohol use: Never             5/1/2023     4:00 PM   Alcohol Use    Prescreen: >3 drinks/day or >7 drinks/week? Not Applicable       Last PSA:   Prostate Specific Antigen Screen   Date Value Ref Range Status   05/02/2022 2.81 0.00 - 4.50 ug/L Final       Reviewed orders with patient. Reviewed health maintenance and updated orders accordingly - Yes  Lab work is in process  Labs reviewed in EPIC    Reviewed and updated as needed this visit by clinical staff   Tobacco  Allergies  Meds              Reviewed and updated as needed this visit by Provider                     Review of Systems   Constitutional: Negative for chills and fever.   HENT: Negative for congestion, ear pain, hearing loss and sore throat.    Eyes: Negative for pain and visual disturbance.   Respiratory: Negative for cough and shortness of breath.    Cardiovascular: Positive for peripheral edema. Negative for chest pain and palpitations.   Gastrointestinal: Negative for abdominal pain, constipation, diarrhea, heartburn, hematochezia and nausea.   Genitourinary: Negative for dysuria, frequency, genital sores, hematuria, impotence, penile discharge and urgency.   Musculoskeletal: Negative for arthralgias, joint swelling and myalgias.   Skin: Negative for rash.   Neurological: Negative for dizziness, weakness, headaches and paresthesias.   Psychiatric/Behavioral: Negative for mood changes. The patient is not nervous/anxious.      CONSTITUTIONAL: NEGATIVE for fever, chills, change in weight  INTEGUMENTARY/SKIN: NEGATIVE for worrisome rashes, moles or lesions  EYES: NEGATIVE for vision changes or irritation  ENT: NEGATIVE for ear, mouth and throat problems  RESP: NEGATIVE for significant cough or SOB  CV: NEGATIVE for chest pain, palpitations or peripheral edema  GI: NEGATIVE for nausea, abdominal pain, heartburn, or change in bowel habits   male: negative for dysuria, hematuria, decreased urinary stream, erectile dysfunction, urethral discharge  MUSCULOSKELETAL: NEGATIVE for significant arthralgias or  "myalgia  NEURO: NEGATIVE for weakness, dizziness or paresthesias  PSYCHIATRIC: NEGATIVE for changes in mood or affect    OBJECTIVE:   BP (!) 155/90 (BP Location: Left arm, Patient Position: Sitting, Cuff Size: Adult Large)   Pulse 63   Temp 98.5  F (36.9  C) (Oral)   Resp 20   Ht 1.695 m (5' 6.73\")   Wt 109.3 kg (241 lb)   SpO2 97%   BMI 38.05 kg/m      Physical Exam  GENERAL: healthy, alert and no distress  EYES: Eyes grossly normal to inspection, PERRL and conjunctivae and sclerae normal  HENT: ear canals and TM's normal, nose and mouth without ulcers or lesions  RESP: lungs clear to auscultation - no rales, rhonchi or wheezes  CV: regular rate and rhythm, normal S1 S2, no S3 or S4, no murmur, click or rub, no peripheral edema and peripheral pulses strong  ABDOMEN: bowel sounds normal  MS: no gross musculoskeletal defects noted, no edema  NEURO: Normal strength and tone, mentation intact and speech normal  PSYCH: mentation appears normal, affect normal/bright    Diagnostic Test Results:  Labs reviewed in Epic    ASSESSMENT/PLAN:   Josh was seen today for physical.    Diagnoses and all orders for this visit:    Health care maintenance  -     Albumin Random Urine Quantitative with Creat Ratio; Future  -     CBC with platelets; Future  -     Prostate Specific Antigen Screen; Future  -     Comprehensive metabolic panel; Future  -     Albumin Random Urine Quantitative with Creat Ratio  -     CBC with platelets  -     Prostate Specific Antigen Screen  -     Comprehensive metabolic panel  Obtain blood work today and follow-up based on results  Chronic kidney disease, stage III (moderate) (H)  Check kidney function today  Try to maintain adequate blood pressure control of 130/80 and less  Avoid medications to exacerbate kidney function  Discussed keeping well-hydrated  Pure hyperglyceridemia  -     Lipid panel reflex to direct LDL Fasting; Future  -     Lipid panel reflex to direct LDL Fasting  Check levels today " and follow-up based on results  Screening for prostate cancer  -     Prostate Specific Antigen Screen; Future  -     Prostate Specific Antigen Screen  Screen with PSA  Stage 3a chronic kidney disease (H)  -     Albumin Random Urine Quantitative with Creat Ratio; Future  -     Albumin Random Urine Quantitative with Creat Ratio  Check urine for protein spilling      Patient has been advised of split billing requirements and indicates understanding: Yes      COUNSELING:   Reviewed preventive health counseling, as reflected in patient instructions       Regular exercise       Healthy diet/nutrition        He reports that he has never smoked. He has never used smokeless tobacco.        Quirino Laureano MD  [facility]

## 2023-05-04 ENCOUNTER — TELEPHONE (OUTPATIENT)
Dept: FAMILY MEDICINE | Facility: CLINIC | Age: 63
End: 2023-05-04
Payer: COMMERCIAL

## 2023-05-04 PROBLEM — E66.01 MORBID OBESITY (H): Status: RESOLVED | Noted: 2019-07-11 | Resolved: 2023-05-04

## 2023-05-04 NOTE — LETTER
May 4, 2023      Josh Mark  [address]  [place] [facility] [place] [address]        Dear ShaylaDeedee,    We are writing to inform you of your test results.    Kidney function is decreased but improved from prior years.  We will continue to monitor.  Continue to keep well-hydrated.  Liver enzymes are normal.  No signs of diabetes.  Cholesterol levels are at goal range.   Continue with current statin.  PSA levels are stable we will monitor yearly.  No other concerns on blood work    Resulted Orders   Albumin Random Urine Quantitative with Creat Ratio   Result Value Ref Range    Creatinine Urine mg/dL 82.4 mg/dL      Comment:      The reference ranges have not been established in urine creatinine. The results should be integrated into the clinical context for interpretation.    Albumin Urine mg/L <12.0 mg/L      Comment:      The reference ranges have not been established in urine albumin. The results should be integrated into the clinical context for interpretation.    Albumin Urine mg/g Cr        Comment:      Unable to calculate, urine albumin and/or urine creatinine is outside detectable limits.  Microalbuminuria is defined as an albumin:creatinine ratio of 17 to 299 for males and 25 to 299 for females. A ratio of albumin:creatinine of 300 or higher is indicative of overt proteinuria.  Due to biologic variability, positive results should be confirmed by a second, first-morning random or 24-hour timed urine specimen. If there is discrepancy, a third specimen is recommended. When 2 out of 3 results are in the microalbuminuria range, this is evidence for incipient nephropathy and warrants increased efforts at glucose control, blood pressure control, and institution of therapy with an angiotensin-converting-enzyme (ACE) inhibitor (if the patient can tolerate it).     Lipid panel reflex to direct LDL Fasting   Result Value Ref Range    Cholesterol 163 <200 mg/dL    Triglycerides 112 <150 mg/dL    Direct Measure  HDL 47 >=40 mg/dL    LDL Cholesterol Calculated 94 <=100 mg/dL    Non HDL Cholesterol 116 <130 mg/dL    Narrative    Cholesterol  Desirable:  <200 mg/dL    Triglycerides  Normal:  Less than 150 mg/dL  Borderline High:  150-199 mg/dL  High:  200-499 mg/dL  Very High:  Greater than or equal to 500 mg/dL    Direct Measure HDL  Female:  Greater than or equal to 50 mg/dL   Male:  Greater than or equal to 40 mg/dL    LDL Cholesterol  Desirable:  <100mg/dL  Above Desirable:  100-129 mg/dL   Borderline High:  130-159 mg/dL   High:  160-189 mg/dL   Very High:  >= 190 mg/dL    Non HDL Cholesterol  Desirable:  130 mg/dL  Above Desirable:  130-159 mg/dL  Borderline High:  160-189 mg/dL  High:  190-219 mg/dL  Very High:  Greater than or equal to 220 mg/dL   CBC with platelets   Result Value Ref Range    WBC Count 8.8 4.0 - 11.0 10e3/uL    RBC Count 4.79 4.40 - 5.90 10e6/uL    Hemoglobin 14.7 13.3 - 17.7 g/dL    Hematocrit 43.5 40.0 - 53.0 %    MCV 91 78 - 100 fL    MCH 30.7 26.5 - 33.0 pg    MCHC 33.8 31.5 - 36.5 g/dL    RDW 13.0 10.0 - 15.0 %    Platelet Count 183 150 - 450 10e3/uL   Prostate Specific Antigen Screen   Result Value Ref Range    Prostate Specific Antigen Screen 3.05 0.00 - 4.50 ng/mL    Narrative    This result is obtained using the Roche Elecsys total PSA method on the maykel e801 immunoassay analyzer. Results obtained with different assay methods or kits cannot be used interchangeably.   Comprehensive metabolic panel   Result Value Ref Range    Sodium 140 136 - 145 mmol/L    Potassium 3.6 3.4 - 5.3 mmol/L    Chloride 105 98 - 107 mmol/L    Carbon Dioxide (CO2) 22 22 - 29 mmol/L    Anion Gap 13 7 - 15 mmol/L    Urea Nitrogen 18.6 8.0 - 23.0 mg/dL    Creatinine 1.36 (H) 0.67 - 1.17 mg/dL    Calcium 9.4 8.8 - 10.2 mg/dL    Glucose 79 70 - 99 mg/dL    Alkaline Phosphatase 58 40 - 129 U/L    AST 45 10 - 50 U/L    ALT 35 10 - 50 U/L    Protein Total 7.5 6.4 - 8.3 g/dL    Albumin 4.4 3.5 - 5.2 g/dL    Bilirubin Total  0.8 <=1.2 mg/dL    GFR Estimate 58 (L) >60 mL/min/1.73m2      Comment:      eGFR calculated using 2021 CKD-EPI equation.       If you have any questions or concerns, please call the clinic at the number listed above.       Sincerely,

## 2023-05-04 NOTE — TELEPHONE ENCOUNTER
----- Message from Quirino Laureano MD sent at 5/3/2023  8:26 AM CDT -----  Kidney function is decreased but improved from prior years.  We will continue to monitor.  Continue to keep well-hydrated.  Liver enzymes are normal.  No signs of diabetes.  Cholesterol levels are at goal range.  Continue with current statin.  PSA levels are stable we will monitor yearly.  No other concerns on blood work.

## 2023-05-31 DIAGNOSIS — I10 ESSENTIAL HYPERTENSION: ICD-10-CM

## 2023-06-01 NOTE — TELEPHONE ENCOUNTER
"1. Routing refill request to provider for review/approval because:  Labs out of range:  CR 1.36 on 5/1/2023  Blood pressure failed  Last Written Prescription Date:  3/3/2023  Last Fill Quantity: 90,  # refills: 0   Last office visit provider:  5/1/2023    2. Routing refill request to provider for review/approval because:  Blood pressure failed  Last Written Prescription Date:  3/3/2023  Last Fill Quantity: 90,  # refills: 0   Last office visit provider:  5/1/2023        3. Routing refill request to provider for review/approval because:  Labs out of range:  CR 1.36 on 5/1/2023  Blood pressure failed  Last Written Prescription Date:  3/3/2023  Last Fill Quantity: 90,  # refills: 0   Last office visit provider:  5/1/2023    4. Routing refill request to provider for review/approval because:  Labs out of range:  CR 1.36 on 5/1/2023  Blood pressure failed    Last Written Prescription Date:  3/3/2023  Last Fill Quantity: 90,  # refills: 0   Last office visit provider:  5/1/2023        Requested Prescriptions   Pending Prescriptions Disp Refills     amLODIPine (NORVASC) 10 MG tablet [Pharmacy Med Name: amLODIPine Besylate Oral Tablet 10 MG] 90 tablet 0     Sig: Take 1 tablet by mouth once daily       Calcium Channel Blockers Protocol  Failed - 6/1/2023  1:40 PM        Failed - Blood pressure under 140/90 in past 12 months     BP Readings from Last 3 Encounters:   05/01/23 (!) 155/80   01/24/23 (!) 141/76   05/18/22 (!) 141/86                 Failed - Normal serum creatinine on file in past 12 months     Recent Labs   Lab Test 05/01/23  1714   CR 1.36*       Ok to refill medication if creatinine is low          Passed - Recent (12 mo) or future (30 days) visit within the authorizing provider's specialty     Patient has had an office visit with the authorizing provider or a provider within the authorizing providers department within the previous 12 mos or has a future within next 30 days. See \"Patient Info\" tab in inbasket, " "or \"Choose Columns\" in Meds & Orders section of the refill encounter.              Passed - Medication is active on med list        Passed - Patient is age 18 or older           atenolol (TENORMIN) 100 MG tablet [Pharmacy Med Name: Atenolol Oral Tablet 100 MG] 90 tablet 0     Sig: Take 1 tablet (100 mg) by mouth once daily       Beta-Blockers Protocol Failed - 6/1/2023  1:40 PM        Failed - Blood pressure under 140/90 in past 12 months     BP Readings from Last 3 Encounters:   05/01/23 (!) 155/80   01/24/23 (!) 141/76   05/18/22 (!) 141/86                 Passed - Patient is age 6 or older        Passed - Recent (12 mo) or future (30 days) visit within the authorizing provider's specialty     Patient has had an office visit with the authorizing provider or a provider within the authorizing providers department within the previous 12 mos or has a future within next 30 days. See \"Patient Info\" tab in inbasket, or \"Choose Columns\" in Meds & Orders section of the refill encounter.              Passed - Medication is active on med list           losartan (COZAAR) 100 MG tablet [Pharmacy Med Name: Losartan Potassium Oral Tablet 100 MG] 90 tablet 0     Sig: Take 1 tablet (100 mg) by mouth once daily       Angiotensin-II Receptors Failed - 5/31/2023  5:14 AM        Failed - Last blood pressure under 140/90 in past 12 months     BP Readings from Last 3 Encounters:   05/01/23 (!) 155/80   01/24/23 (!) 141/76   05/18/22 (!) 141/86                 Failed - Normal serum creatinine on file in past 12 months     Recent Labs   Lab Test 05/01/23  1714   CR 1.36*       Ok to refill medication if creatinine is low          Passed - Recent (12 mo) or future (30 days) visit within the authorizing provider's specialty     Patient has had an office visit with the authorizing provider or a provider within the authorizing providers department within the previous 12 mos or has a future within next 30 days. See \"Patient Info\" tab in " "inbasket, or \"Choose Columns\" in Meds & Orders section of the refill encounter.              Passed - Medication is active on med list        Passed - Patient is age 18 or older        Passed - Normal serum potassium on file in past 12 months     Recent Labs   Lab Test 05/01/23  1714   POTASSIUM 3.6                       losartan (COZAAR) 50 MG tablet [Pharmacy Med Name: Losartan Potassium Oral Tablet 50 MG] 90 tablet 0     Sig: Take 1 tablet (50 mg) by mouth once daily       Angiotensin-II Receptors Failed - 6/1/2023  1:41 PM        Failed - Last blood pressure under 140/90 in past 12 months     BP Readings from Last 3 Encounters:   05/01/23 (!) 155/80   01/24/23 (!) 141/76   05/18/22 (!) 141/86                 Failed - Normal serum creatinine on file in past 12 months     Recent Labs   Lab Test 05/01/23  1714   CR 1.36*       Ok to refill medication if creatinine is low          Passed - Recent (12 mo) or future (30 days) visit within the authorizing provider's specialty     Patient has had an office visit with the authorizing provider or a provider within the authorizing providers department within the previous 12 mos or has a future within next 30 days. See \"Patient Info\" tab in inbasket, or \"Choose Columns\" in Meds & Orders section of the refill encounter.              Passed - Medication is active on med list        Passed - Patient is age 18 or older        Passed - Normal serum potassium on file in past 12 months     Recent Labs   Lab Test 05/01/23  1714   POTASSIUM 3.6                         Justa Rene RN 06/01/23 1:41 PM  "

## 2023-06-02 RX ORDER — ATENOLOL 100 MG/1
TABLET ORAL
Qty: 90 TABLET | Refills: 0 | Status: SHIPPED | OUTPATIENT
Start: 2023-06-02 | End: 2023-08-29

## 2023-06-02 RX ORDER — LOSARTAN POTASSIUM 50 MG/1
TABLET ORAL
Qty: 90 TABLET | Refills: 0 | Status: SHIPPED | OUTPATIENT
Start: 2023-06-02 | End: 2023-08-29

## 2023-06-02 RX ORDER — AMLODIPINE BESYLATE 10 MG/1
TABLET ORAL
Qty: 90 TABLET | Refills: 0 | Status: SHIPPED | OUTPATIENT
Start: 2023-06-02 | End: 2023-08-29

## 2023-06-02 RX ORDER — LOSARTAN POTASSIUM 100 MG/1
TABLET ORAL
Qty: 90 TABLET | Refills: 0 | Status: SHIPPED | OUTPATIENT
Start: 2023-06-02 | End: 2023-08-29

## 2023-07-25 DIAGNOSIS — E78.2 MIXED HYPERLIPIDEMIA: ICD-10-CM

## 2023-07-26 RX ORDER — SIMVASTATIN 40 MG
TABLET ORAL
Qty: 90 TABLET | Refills: 3 | Status: SHIPPED | OUTPATIENT
Start: 2023-07-26 | End: 2024-07-29

## 2023-07-26 NOTE — TELEPHONE ENCOUNTER
"Last Written Prescription Date:  4/14/2023  Last Fill Quantity: 90,  # refills: 0   Last office visit provider:  5/1/2023     Requested Prescriptions   Pending Prescriptions Disp Refills    simvastatin (ZOCOR) 40 MG tablet [Pharmacy Med Name: Simvastatin Oral Tablet 40 MG] 90 tablet 0     Sig: Take 1 tablet (40 mg) by mouth nightly At Bedtime       Statins Protocol Passed - 7/25/2023  4:55 PM        Passed - LDL on file in past 12 months     Recent Labs   Lab Test 05/01/23  1714   LDL 94             Passed - No abnormal creatine kinase in past 12 months     No lab results found.             Passed - Recent (12 mo) or future (30 days) visit within the authorizing provider's specialty     Patient has had an office visit with the authorizing provider or a provider within the authorizing providers department within the previous 12 mos or has a future within next 30 days. See \"Patient Info\" tab in inbasket, or \"Choose Columns\" in Meds & Orders section of the refill encounter.              Passed - Medication is active on med list        Passed - Patient is age 18 or older             Modesta Cárdenas RN 07/26/23 10:44 AM  "

## 2023-08-08 DIAGNOSIS — I10 ESSENTIAL HYPERTENSION: ICD-10-CM

## 2023-08-08 NOTE — TELEPHONE ENCOUNTER
"Routing refill request to provider for review/approval because:  Blood pressure failed    Last Written Prescription Date:  3/31/23  Last Fill Quantity: 180,  # refills: 0   Last office visit provider:  5/1/23     Requested Prescriptions   Pending Prescriptions Disp Refills    hydrALAZINE (APRESOLINE) 50 MG tablet [Pharmacy Med Name: hydrALAZINE HCl Oral Tablet 50 MG] 180 tablet 0     Sig: Take 1 tablet  by mouth 2 times daily before a meal.       Vasodilators Failed - 8/8/2023  5:48 PM        Failed - Most recent BP less than 140/90 on record     BP Readings from Last 3 Encounters:   05/01/23 (!) 155/80   01/24/23 (!) 141/76   05/18/22 (!) 141/86                 Passed - Most recent encounter is not a hospital encounter. Patient has recent (12 mos) or future (1 mos) visit with authorizing provider's specialty     Patient's most recent encounter is NOT a hospital encounter and has had an office visit in the last 12 months or has a visit in the next 30 days with authorizing provider or within the authorizing provider's specialty.      See \"Patient Info\" tab in inbasket, or \"Choose Columns\" in Meds & Orders section of the refill encounter.      If most recent encounter is a hospital encounter AND the patient does NOT have an appointment scheduled with the authorizing provider or authorizing provider's specialty within the next 30 days, forward refill to authorizing provider for medication review.          Passed - Medication is active on med list        Passed - Patient is of age 18 years or older             Justa Rene RN 08/08/23 5:48 PM  "

## 2023-08-09 RX ORDER — HYDRALAZINE HYDROCHLORIDE 50 MG/1
TABLET, FILM COATED ORAL
Qty: 180 TABLET | Refills: 0 | Status: SHIPPED | OUTPATIENT
Start: 2023-08-09 | End: 2023-11-06

## 2023-08-10 NOTE — TELEPHONE ENCOUNTER
Patient Returning Call    Reason for call:  Returning our call     Information relayed to patient:  Yes, relayed Neida's notation. Patient scheduled for BP check on 8/22.     Patient has additional questions:  No      Okay to leave a detailed message?: No at Home number on file [phone] (home)

## 2023-08-29 DIAGNOSIS — I10 ESSENTIAL HYPERTENSION: ICD-10-CM

## 2023-08-29 RX ORDER — ATENOLOL 100 MG/1
TABLET ORAL
Qty: 90 TABLET | Refills: 0 | Status: SHIPPED | OUTPATIENT
Start: 2023-08-29 | End: 2023-11-27

## 2023-08-29 RX ORDER — LOSARTAN POTASSIUM 100 MG/1
TABLET ORAL
Qty: 90 TABLET | Refills: 0 | Status: SHIPPED | OUTPATIENT
Start: 2023-08-29 | End: 2023-11-27

## 2023-08-29 RX ORDER — AMLODIPINE BESYLATE 10 MG/1
TABLET ORAL
Qty: 90 TABLET | Refills: 0 | Status: SHIPPED | OUTPATIENT
Start: 2023-08-29 | End: 2023-11-27

## 2023-08-29 RX ORDER — LOSARTAN POTASSIUM 50 MG/1
TABLET ORAL
Qty: 90 TABLET | Refills: 0 | Status: SHIPPED | OUTPATIENT
Start: 2023-08-29 | End: 2023-12-12

## 2023-11-06 DIAGNOSIS — I10 ESSENTIAL HYPERTENSION: ICD-10-CM

## 2023-11-07 RX ORDER — HYDRALAZINE HYDROCHLORIDE 50 MG/1
TABLET, FILM COATED ORAL
Qty: 180 TABLET | Refills: 1 | Status: SHIPPED | OUTPATIENT
Start: 2023-11-07 | End: 2024-05-09

## 2023-11-24 DIAGNOSIS — I10 ESSENTIAL HYPERTENSION: ICD-10-CM

## 2023-11-27 RX ORDER — AMLODIPINE BESYLATE 10 MG/1
TABLET ORAL
Qty: 90 TABLET | Refills: 0 | Status: SHIPPED | OUTPATIENT
Start: 2023-11-27 | End: 2024-02-22

## 2023-11-27 RX ORDER — LOSARTAN POTASSIUM 100 MG/1
TABLET ORAL
Qty: 90 TABLET | Refills: 0 | Status: SHIPPED | OUTPATIENT
Start: 2023-11-27 | End: 2024-02-22

## 2023-11-27 RX ORDER — ATENOLOL 100 MG/1
TABLET ORAL
Qty: 90 TABLET | Refills: 0 | Status: SHIPPED | OUTPATIENT
Start: 2023-11-27 | End: 2024-02-22

## 2023-12-11 DIAGNOSIS — I10 ESSENTIAL HYPERTENSION: ICD-10-CM

## 2023-12-12 RX ORDER — LOSARTAN POTASSIUM 50 MG/1
TABLET ORAL
Qty: 90 TABLET | Refills: 0 | Status: SHIPPED | OUTPATIENT
Start: 2023-12-12 | End: 2024-03-12

## 2024-02-05 ENCOUNTER — PATIENT OUTREACH (OUTPATIENT)
Dept: GASTROENTEROLOGY | Facility: CLINIC | Age: 64
End: 2024-02-05
Payer: MEDICARE

## 2024-02-21 DIAGNOSIS — I10 ESSENTIAL HYPERTENSION: ICD-10-CM

## 2024-02-22 RX ORDER — LOSARTAN POTASSIUM 100 MG/1
TABLET ORAL
Qty: 90 TABLET | Refills: 0 | Status: SHIPPED | OUTPATIENT
Start: 2024-02-22 | End: 2024-08-16

## 2024-02-22 RX ORDER — ATENOLOL 100 MG/1
TABLET ORAL
Qty: 90 TABLET | Refills: 0 | Status: SHIPPED | OUTPATIENT
Start: 2024-02-22 | End: 2024-08-16

## 2024-02-22 RX ORDER — AMLODIPINE BESYLATE 10 MG/1
TABLET ORAL
Qty: 90 TABLET | Refills: 0 | Status: SHIPPED | OUTPATIENT
Start: 2024-02-22 | End: 2024-08-16

## 2024-03-11 DIAGNOSIS — I10 ESSENTIAL HYPERTENSION: ICD-10-CM

## 2024-03-12 RX ORDER — LOSARTAN POTASSIUM 50 MG/1
TABLET ORAL
Qty: 90 TABLET | Refills: 0 | Status: SHIPPED | OUTPATIENT
Start: 2024-03-12 | End: 2024-06-04

## 2024-03-29 DIAGNOSIS — Z12.11 COLON CANCER SCREENING: ICD-10-CM

## 2024-04-12 ENCOUNTER — ORDERS ONLY (AUTO-RELEASED) (OUTPATIENT)
Dept: FAMILY MEDICINE | Facility: CLINIC | Age: 64
End: 2024-04-12
Payer: MEDICARE

## 2024-04-12 DIAGNOSIS — Z12.11 COLON CANCER SCREENING: ICD-10-CM

## 2024-04-25 LAB — NONINV COLON CA DNA+OCC BLD SCRN STL QL: NEGATIVE

## 2024-05-09 DIAGNOSIS — I10 ESSENTIAL HYPERTENSION: ICD-10-CM

## 2024-05-09 RX ORDER — CLONIDINE HYDROCHLORIDE 0.2 MG/1
0.2 TABLET ORAL 2 TIMES DAILY
Qty: 180 TABLET | Refills: 0 | Status: SHIPPED | OUTPATIENT
Start: 2024-05-09 | End: 2024-08-06

## 2024-05-09 RX ORDER — HYDRALAZINE HYDROCHLORIDE 50 MG/1
TABLET, FILM COATED ORAL
Qty: 180 TABLET | Refills: 0 | Status: SHIPPED | OUTPATIENT
Start: 2024-05-09 | End: 2024-08-07

## 2024-06-04 DIAGNOSIS — I10 ESSENTIAL HYPERTENSION: ICD-10-CM

## 2024-06-04 RX ORDER — LOSARTAN POTASSIUM 50 MG/1
TABLET ORAL
Qty: 90 TABLET | Refills: 0 | Status: SHIPPED | OUTPATIENT
Start: 2024-06-04 | End: 2024-08-22

## 2024-07-28 DIAGNOSIS — E78.2 MIXED HYPERLIPIDEMIA: ICD-10-CM

## 2024-07-29 DIAGNOSIS — E78.2 MIXED HYPERLIPIDEMIA: ICD-10-CM

## 2024-07-29 RX ORDER — SIMVASTATIN 40 MG
TABLET ORAL
Qty: 90 TABLET | Refills: 0 | Status: SHIPPED | OUTPATIENT
Start: 2024-07-29 | End: 2024-08-22

## 2024-07-30 RX ORDER — SIMVASTATIN 40 MG
TABLET ORAL
Qty: 90 TABLET | Refills: 0 | OUTPATIENT
Start: 2024-07-30

## 2024-08-06 DIAGNOSIS — I10 ESSENTIAL HYPERTENSION: ICD-10-CM

## 2024-08-06 RX ORDER — CLONIDINE HYDROCHLORIDE 0.2 MG/1
0.2 TABLET ORAL 2 TIMES DAILY
Qty: 180 TABLET | Refills: 0 | Status: SHIPPED | OUTPATIENT
Start: 2024-08-06 | End: 2024-08-22

## 2024-08-07 DIAGNOSIS — I10 ESSENTIAL HYPERTENSION: ICD-10-CM

## 2024-08-07 RX ORDER — HYDRALAZINE HYDROCHLORIDE 50 MG/1
TABLET, FILM COATED ORAL
Qty: 180 TABLET | Refills: 0 | Status: SHIPPED | OUTPATIENT
Start: 2024-08-07 | End: 2024-08-22

## 2024-08-16 DIAGNOSIS — I10 ESSENTIAL HYPERTENSION: ICD-10-CM

## 2024-08-16 RX ORDER — AMLODIPINE BESYLATE 10 MG/1
10 TABLET ORAL DAILY
Qty: 90 TABLET | Refills: 0 | Status: SHIPPED | OUTPATIENT
Start: 2024-08-16 | End: 2024-08-22

## 2024-08-16 RX ORDER — LOSARTAN POTASSIUM 100 MG/1
100 TABLET ORAL DAILY
Qty: 90 TABLET | Refills: 0 | Status: SHIPPED | OUTPATIENT
Start: 2024-08-16 | End: 2024-08-22

## 2024-08-16 RX ORDER — ATENOLOL 100 MG/1
100 TABLET ORAL DAILY
Qty: 90 TABLET | Refills: 0 | Status: SHIPPED | OUTPATIENT
Start: 2024-08-16 | End: 2024-08-22

## 2024-08-22 ENCOUNTER — OFFICE VISIT (OUTPATIENT)
Dept: FAMILY MEDICINE | Facility: CLINIC | Age: 64
End: 2024-08-22
Payer: MEDICARE

## 2024-08-22 VITALS
BODY MASS INDEX: 40.02 KG/M2 | SYSTOLIC BLOOD PRESSURE: 134 MMHG | DIASTOLIC BLOOD PRESSURE: 75 MMHG | HEIGHT: 67 IN | TEMPERATURE: 98 F | WEIGHT: 255 LBS | RESPIRATION RATE: 20 BRPM | HEART RATE: 60 BPM | OXYGEN SATURATION: 100 %

## 2024-08-22 DIAGNOSIS — E66.01 CLASS 2 SEVERE OBESITY DUE TO EXCESS CALORIES WITH SERIOUS COMORBIDITY AND BODY MASS INDEX (BMI) OF 39.0 TO 39.9 IN ADULT (H): ICD-10-CM

## 2024-08-22 DIAGNOSIS — Z13.6 ENCOUNTER FOR ABDOMINAL AORTIC ANEURYSM (AAA) SCREENING: ICD-10-CM

## 2024-08-22 DIAGNOSIS — I10 ESSENTIAL HYPERTENSION: ICD-10-CM

## 2024-08-22 DIAGNOSIS — E66.812 CLASS 2 SEVERE OBESITY DUE TO EXCESS CALORIES WITH SERIOUS COMORBIDITY AND BODY MASS INDEX (BMI) OF 39.0 TO 39.9 IN ADULT (H): ICD-10-CM

## 2024-08-22 DIAGNOSIS — Z12.5 SCREENING FOR PROSTATE CANCER: ICD-10-CM

## 2024-08-22 DIAGNOSIS — I10 PRIMARY HYPERTENSION: ICD-10-CM

## 2024-08-22 DIAGNOSIS — Z00.00 HEALTH CARE MAINTENANCE: ICD-10-CM

## 2024-08-22 DIAGNOSIS — E78.2 MIXED HYPERLIPIDEMIA: ICD-10-CM

## 2024-08-22 DIAGNOSIS — Z23 ENCOUNTER FOR IMMUNIZATION: ICD-10-CM

## 2024-08-22 DIAGNOSIS — N18.31 STAGE 3A CHRONIC KIDNEY DISEASE (H): Primary | ICD-10-CM

## 2024-08-22 LAB
ALBUMIN SERPL BCG-MCNC: 4.4 G/DL (ref 3.5–5.2)
ALP SERPL-CCNC: 59 U/L (ref 40–150)
ALT SERPL W P-5'-P-CCNC: 28 U/L (ref 0–70)
ANION GAP SERPL CALCULATED.3IONS-SCNC: 11 MMOL/L (ref 7–15)
AST SERPL W P-5'-P-CCNC: 29 U/L (ref 0–45)
BILIRUB SERPL-MCNC: 0.7 MG/DL
BUN SERPL-MCNC: 14.1 MG/DL (ref 8–23)
CALCIUM SERPL-MCNC: 9.5 MG/DL (ref 8.8–10.4)
CHLORIDE SERPL-SCNC: 105 MMOL/L (ref 98–107)
CHOLEST SERPL-MCNC: 159 MG/DL
CREAT SERPL-MCNC: 1.49 MG/DL (ref 0.67–1.17)
EGFRCR SERPLBLD CKD-EPI 2021: 51 ML/MIN/1.73M2
ERYTHROCYTE [DISTWIDTH] IN BLOOD BY AUTOMATED COUNT: 13.2 % (ref 10–15)
FASTING STATUS PATIENT QL REPORTED: YES
FASTING STATUS PATIENT QL REPORTED: YES
GLUCOSE SERPL-MCNC: 105 MG/DL (ref 70–99)
HCO3 SERPL-SCNC: 26 MMOL/L (ref 22–29)
HCT VFR BLD AUTO: 48 % (ref 40–53)
HDLC SERPL-MCNC: 41 MG/DL
HGB BLD-MCNC: 16.1 G/DL (ref 13.3–17.7)
LDLC SERPL CALC-MCNC: 94 MG/DL
MCH RBC QN AUTO: 30.7 PG (ref 26.5–33)
MCHC RBC AUTO-ENTMCNC: 33.5 G/DL (ref 31.5–36.5)
MCV RBC AUTO: 92 FL (ref 78–100)
NONHDLC SERPL-MCNC: 118 MG/DL
PLATELET # BLD AUTO: 207 10E3/UL (ref 150–450)
POTASSIUM SERPL-SCNC: 4.2 MMOL/L (ref 3.4–5.3)
PROT SERPL-MCNC: 7.7 G/DL (ref 6.4–8.3)
PSA SERPL DL<=0.01 NG/ML-MCNC: 4.08 NG/ML (ref 0–4.5)
RBC # BLD AUTO: 5.24 10E6/UL (ref 4.4–5.9)
SODIUM SERPL-SCNC: 142 MMOL/L (ref 135–145)
TRIGL SERPL-MCNC: 119 MG/DL
WBC # BLD AUTO: 8.2 10E3/UL (ref 4–11)

## 2024-08-22 PROCEDURE — 93010 ELECTROCARDIOGRAM REPORT: CPT | Mod: OFF | Performed by: INTERNAL MEDICINE

## 2024-08-22 PROCEDURE — 80053 COMPREHEN METABOLIC PANEL: CPT | Performed by: FAMILY MEDICINE

## 2024-08-22 PROCEDURE — 99214 OFFICE O/P EST MOD 30 MIN: CPT | Mod: 25 | Performed by: FAMILY MEDICINE

## 2024-08-22 PROCEDURE — G0009 ADMIN PNEUMOCOCCAL VACCINE: HCPCS | Performed by: FAMILY MEDICINE

## 2024-08-22 PROCEDURE — 80061 LIPID PANEL: CPT | Performed by: FAMILY MEDICINE

## 2024-08-22 PROCEDURE — 90677 PCV20 VACCINE IM: CPT | Performed by: FAMILY MEDICINE

## 2024-08-22 PROCEDURE — 93005 ELECTROCARDIOGRAM TRACING: CPT | Performed by: FAMILY MEDICINE

## 2024-08-22 PROCEDURE — G0438 PPPS, INITIAL VISIT: HCPCS | Performed by: FAMILY MEDICINE

## 2024-08-22 PROCEDURE — G0103 PSA SCREENING: HCPCS | Performed by: FAMILY MEDICINE

## 2024-08-22 PROCEDURE — 85027 COMPLETE CBC AUTOMATED: CPT | Performed by: FAMILY MEDICINE

## 2024-08-22 PROCEDURE — 36415 COLL VENOUS BLD VENIPUNCTURE: CPT | Performed by: FAMILY MEDICINE

## 2024-08-22 RX ORDER — ATENOLOL 100 MG/1
100 TABLET ORAL DAILY
Qty: 90 TABLET | Refills: 0 | Status: SHIPPED | OUTPATIENT
Start: 2024-08-22

## 2024-08-22 RX ORDER — HYDRALAZINE HYDROCHLORIDE 50 MG/1
TABLET, FILM COATED ORAL
Qty: 180 TABLET | Refills: 3 | Status: SHIPPED | OUTPATIENT
Start: 2024-08-22

## 2024-08-22 RX ORDER — LOSARTAN POTASSIUM 100 MG/1
100 TABLET ORAL DAILY
Qty: 90 TABLET | Refills: 3 | Status: SHIPPED | OUTPATIENT
Start: 2024-08-22

## 2024-08-22 RX ORDER — LOSARTAN POTASSIUM 50 MG/1
50 TABLET ORAL DAILY
Qty: 90 TABLET | Refills: 3 | Status: SHIPPED | OUTPATIENT
Start: 2024-08-22

## 2024-08-22 RX ORDER — CLONIDINE HYDROCHLORIDE 0.2 MG/1
0.2 TABLET ORAL 2 TIMES DAILY
Qty: 180 TABLET | Refills: 3 | Status: SHIPPED | OUTPATIENT
Start: 2024-08-22

## 2024-08-22 RX ORDER — SIMVASTATIN 40 MG
40 TABLET ORAL AT BEDTIME
Qty: 90 TABLET | Refills: 3 | Status: SHIPPED | OUTPATIENT
Start: 2024-08-22

## 2024-08-22 RX ORDER — AMLODIPINE BESYLATE 10 MG/1
10 TABLET ORAL DAILY
Qty: 90 TABLET | Refills: 3 | Status: SHIPPED | OUTPATIENT
Start: 2024-08-22

## 2024-08-22 SDOH — HEALTH STABILITY: PHYSICAL HEALTH: ON AVERAGE, HOW MANY DAYS PER WEEK DO YOU ENGAGE IN MODERATE TO STRENUOUS EXERCISE (LIKE A BRISK WALK)?: 5 DAYS

## 2024-08-22 ASSESSMENT — SOCIAL DETERMINANTS OF HEALTH (SDOH): HOW OFTEN DO YOU GET TOGETHER WITH FRIENDS OR RELATIVES?: MORE THAN THREE TIMES A WEEK

## 2024-08-22 NOTE — PROGRESS NOTES
Preventive Care Visit  [facility]  Quirino Laureano MD, Family Medicine  Aug 22, 2024      Assessment & Plan     Health care maintenance  Patient here for annual exam  Fasting interested in fasting blood work  - CBC with platelets  - Comprehensive metabolic panel (BMP + Alb, Alk Phos, ALT, AST, Total. 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  Check lipid levels  - simvastatin (ZOCOR) 40 MG tablet  Dispense: 90 tablet; Refill: 3    Screening for prostate cancer  Patient to continue screen for prostate cancer with PSA testing  - PSA, screen    Class 2 severe obesity due to excess calories with serious comorbidity and body mass index (BMI) of 39.0 to 39.9 in adult (H)  Patient is aware of his weight we discussed diet and exercise    Primary hypertension  Blood pressure at goal range continue with current medications    Encounter for abdominal aortic aneurysm (AAA) screening  Discussed ultrasound for abdominal aortic aneurysm screening  - US Abdominal Aorta Imaging    Encounter for immunization  Update with pneumococcal 20 today  - PNEUMOCOCCAL 20 VALENT CONJUGATE (PREVNAR 20)    Essential hypertension  Refill blood pressure medication sent to the pharmacy  Patient feels since alf and just consulting his blood pressure might have been running little bit low we will monitor if running low he will update me in the next month and we can discontinue his losartan 50  - amLODIPine (NORVASC) 10 MG tablet  Dispense: 90 tablet; Refill: 3  - atenolol (TENORMIN) 100 MG tablet  Dispense:  "90 tablet; Refill: 0  - cloNIDine (CATAPRES) 0.2 MG tablet  Dispense: 180 tablet; Refill: 3  - hydrALAZINE (APRESOLINE) 50 MG tablet  Dispense: 180 tablet; Refill: 3  - losartan (COZAAR) 100 MG tablet  Dispense: 90 tablet; Refill: 3  - losartan (COZAAR) 50 MG tablet  Dispense: 90 tablet; Refill: 3      Patient has been advised of split billing requirements and indicates understanding: Yes        BMI  Estimated body mass index is 39.94 kg/m  as calculated from the following:    Height as of this encounter: 1.702 m (5' 7\").    Weight as of this encounter: 115.7 kg (255 lb).   Weight management plan: Discussed healthy diet and exercise guidelines    Counseling  Appropriate preventive services were addressed with this patient via screening, questionnaire, or discussion as appropriate for fall prevention, nutrition, physical activity, Tobacco-use cessation, social engagement, weight loss and cognition.  Checklist reviewing preventive services available has been given to the patient.  Reviewed patient's diet, addressing concerns and/or questions.   The patient was instructed to see the dentist every 6 months.           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  Blood pressure is at goal range today in clinic however his baby been running a little low since retiring-if it continues to run low he will contact me we will discontinue likely his losartan 50.  History of chronic kidney disease stage IIIa we are monitoring and try to avoid medications to exacerbate kidney function he is tolerating his current statin without complication.  He " like to continue screen for prostate cancer.  We discussed welcome to Medicare baseline EKG and AAA screening.  We discussed vaccines will update with pneumococcal 20 today.  He is contemplating getting a second dog.  Discussed his extra time with [employer] at this point and the need to work on diet and exercise.            8/22/2024   General Health   How would you rate your overall physical health? Good   Feel stress (tense, anxious, or unable to sleep) Not at all            8/22/2024   Nutrition   Diet: Regular (no restrictions)            8/22/2024   Exercise   Days per week of moderate/strenous exercise 5 days            8/22/2024   Social Factors   Frequency of gathering with friends or relatives More than three times a week   Worry food won't last until get money to buy more No   Food not last or not have enough money for food? No   Do you have housing? (Housing is defined as stable permanent housing and does not include staying ouside in a car, in a tent, in an abandoned building, in an overnight shelter, or couch-surfing.) Yes   Are you worried about losing your housing? No   Lack of transportation? No   Unable to get utilities (heat,electricity)? No            8/22/2024   Fall Risk   Fallen 2 or more times in the past year? No   Trouble with walking or balance? No             8/22/2024   Activities of Daily Living- Home Safety   Needs help with the following daily activites None of the above   Safety concerns in the home None of the above            8/22/2024   Dental   Dentist two times every year? (!) NO            8/22/2024   Hearing Screening   Hearing concerns? None of the above            8/22/2024   Driving Risk Screening   Patient/family members have concerns about driving No            8/22/2024   General Alertness/Fatigue Screening   Have you been more tired than usual lately? No            8/22/2024   Urinary Incontinence Screening   Bothered by leaking urine in past 6 months No             8/22/2024   TB Screening   Were you born outside of the [place]? No            Today's PHQ-2 Score:       8/22/2024     7:07 AM   PHQ-2 ( 1999 [employer])   Q1: Little interest or pleasure in doing things 0   Q2: Feeling down, depressed or hopeless 0   PHQ-2 Score 0   Q1: Little interest or pleasure in doing things Not at all   Q2: Feeling down, depressed or hopeless Not at all   PHQ-2 Score 0           8/22/2024   Substance Use   Alcohol more than 3/day or more than 7/wk Not Applicable   Do you have a current opioid prescription? No   How severe/bad is pain from 1 to 10? 0/10 (No Pain)   Do you use any other substances recreationally? No        Social History     Tobacco Use    Smoking status: Never    Smokeless tobacco: Never   Substance Use Topics    Alcohol use: Never    Drug use: Never           8/22/2024   AAA Screening   Family history of Abdominal Aortic Aneurysm (AAA)? No      Last PSA:   Prostate Specific Antigen Screen   Date Value Ref Range Status   05/01/2023 3.05 0.00 - 4.50 ng/mL Final   05/02/2022 2.81 0.00 - 4.50 ug/L Final     ASCVD Risk   The 10-year ASCVD risk score (Harmeet LAU, et al., 2019) is: 15.5%    Values used to calculate the score:      Age: 64 years      Sex: Male      Is Non- : No      Diabetic: No      Tobacco smoker: No      Systolic Blood Pressure: 134 mmHg      Is BP treated: Yes      HDL Cholesterol: 47 mg/dL      Total Cholesterol: 163 mg/dL            Reviewed and updated as needed this visit by Provider                      Current providers sharing in care for this patient include:  Patient Care Team:  Quirino Laureano MD as PCP - General (Family Medicine)  Quirino Laureano MD as Assigned PCP    The following health maintenance items are reviewed in Epic and correct as of today:  Health Maintenance   Topic Date Due    URINALYSIS  Never done    RSV VACCINE (Pregnancy & 60+) (1 - 1-dose 60+ series) Never done    ANNUAL REVIEW OF HM ORDERS  02/11/2023  "   COVID-19 Vaccine (4 - 2023-24 season) 09/01/2023    MEDICARE ANNUAL WELLNESS VISIT  05/01/2024    BMP  05/01/2024    LIPID  05/01/2024    MICROALBUMIN  05/01/2024    HEMOGLOBIN  05/01/2024    INFLUENZA VACCINE (1) 09/01/2024    FALL RISK ASSESSMENT  08/22/2025    GLUCOSE  05/01/2026    ADVANCE CARE PLANNING  02/11/2027    COLORECTAL CANCER SCREENING  04/18/2027    DTAP/TDAP/TD IMMUNIZATION (3 - Td or Tdap) 11/20/2030    PHQ-2 (once per calendar year)  Completed    Pneumococcal Vaccine: 65+ Years  Completed    ZOSTER IMMUNIZATION  Completed    HPV IMMUNIZATION  Aged Out    MENINGITIS IMMUNIZATION  Aged Out    RSV MONOCLONAL ANTIBODY  Aged Out    HEPATITIS C SCREENING  Discontinued            Objective    Exam  /75   Pulse 60   Temp 98  F (36.7  C) (Oral)   Resp 20   Ht 1.702 m (5' 7\")   Wt 115.7 kg (255 lb)   SpO2 100%   BMI 39.94 kg/m     Estimated body mass index is 39.94 kg/m  as calculated from the following:    Height as of this encounter: 1.702 m (5' 7\").    Weight as of this encounter: 115.7 kg (255 lb).    Physical Exam  GENERAL: alert and no distress  EYES: Eyes grossly normal to inspection, PERRL and conjunctivae and sclerae normal  HENT: ear canals and TM's normal, nose and mouth without ulcers or lesions  RESP: lungs clear to auscultation - no rales, rhonchi or wheezes  CV: regular rate and rhythm, normal S1 S2, no S3 or S4, no murmur, click or rub, no peripheral edema  MS: no gross musculoskeletal defects noted, no edema  NEURO: Normal strength and tone, mentation intact and speech normal  PSYCH: mentation appears normal, affect normal/bright         No data to display                    Prior to immunization administration, verified patients identity using patient s name and date of birth. Please see Immunization Activity for additional information.     Screening Questionnaire for Adult Immunization    Are you sick today?   No   Do you have allergies to medications, food, a vaccine " component or latex?   No   Have you ever had a serious reaction after receiving a vaccination?   No   Do you have a long-term health problem with heart, lung, kidney, or metabolic disease (e.g., diabetes), asthma, a blood disorder, no spleen, complement component deficiency, a cochlear implant, or a spinal fluid leak?  Are you on long-term aspirin therapy?   No   Do you have cancer, leukemia, HIV/AIDS, or any other immune system problem?   No   Do you have a parent, brother, or sister with an immune system problem?   No   In the past 3 months, have you taken medications that affect  your immune system, such as prednisone, other steroids, or anticancer drugs; drugs for the treatment of rheumatoid arthritis, Crohn s disease, or psoriasis; or have you had radiation treatments?   No   Have you had a seizure, or a brain or other nervous system problem?   No   During the past year, have you received a transfusion of blood or blood    products, or been given immune (gamma) globulin or antiviral drug?   No   For women: Are you pregnant or is there a chance you could become       pregnant during the next month?   No   Have you received any vaccinations in the past 4 weeks?   No     Immunization questionnaire answers were all negative.  Patient instructed to remain in clinic for 15 minutes afterwards, and to report any adverse reactions. Screening performed by Hilda Ellison on 8/22/2024 at 7:48 AM.        Signed Electronically by: Quirino Laureano MD

## 2024-08-26 ENCOUNTER — HOSPITAL ENCOUNTER (OUTPATIENT)
Dept: ULTRASOUND IMAGING | Facility: HOSPITAL | Age: 64
Discharge: HOME | End: 2024-08-26
Attending: FAMILY MEDICINE | Admitting: FAMILY MEDICINE
Payer: MEDICARE

## 2024-08-26 DIAGNOSIS — Z13.6 ENCOUNTER FOR ABDOMINAL AORTIC ANEURYSM (AAA) SCREENING: ICD-10-CM

## 2024-08-26 LAB
ATRIAL RATE - MUSE: 56 BPM
DIASTOLIC BLOOD PRESSURE - MUSE: NORMAL MMHG
INTERPRETATION ECG - MUSE: NORMAL
P AXIS - MUSE: 29 DEGREES
PR INTERVAL - MUSE: 186 MS
QRS DURATION - MUSE: 84 MS
QT - MUSE: 448 MS
QTC - MUSE: 432 MS
R AXIS - MUSE: -15 DEGREES
SYSTOLIC BLOOD PRESSURE - MUSE: NORMAL MMHG
T AXIS - MUSE: 3 DEGREES
VENTRICULAR RATE- MUSE: 56 BPM

## 2024-08-26 PROCEDURE — 76775 US EXAM ABDO BACK WALL LIM: CPT

## 2025-02-05 DIAGNOSIS — I10 ESSENTIAL HYPERTENSION: ICD-10-CM

## 2025-02-06 RX ORDER — ATENOLOL 100 MG/1
100 TABLET ORAL DAILY
Qty: 90 TABLET | Refills: 1 | Status: SHIPPED | OUTPATIENT
Start: 2025-02-06

## 2025-02-17 ENCOUNTER — NURSE TRIAGE (OUTPATIENT)
Dept: FAMILY MEDICINE | Facility: CLINIC | Age: 65
End: 2025-02-17
Payer: MEDICARE

## 2025-02-17 ENCOUNTER — HOSPITAL ENCOUNTER (EMERGENCY)
Facility: HOSPITAL | Age: 65
Discharge: HOME | End: 2025-02-17
Attending: EMERGENCY MEDICINE | Admitting: EMERGENCY MEDICINE
Payer: MEDICARE

## 2025-02-17 ENCOUNTER — APPOINTMENT (OUTPATIENT)
Dept: RADIOLOGY | Facility: HOSPITAL | Age: 65
End: 2025-02-17
Payer: MEDICARE

## 2025-02-17 VITALS
BODY MASS INDEX: 44.18 KG/M2 | HEART RATE: 66 BPM | SYSTOLIC BLOOD PRESSURE: 145 MMHG | HEIGHT: 66 IN | TEMPERATURE: 98.4 F | DIASTOLIC BLOOD PRESSURE: 89 MMHG | RESPIRATION RATE: 26 BRPM | WEIGHT: 274.9 LBS | OXYGEN SATURATION: 95 %

## 2025-02-17 DIAGNOSIS — R06.00 DYSPNEA, UNSPECIFIED TYPE: ICD-10-CM

## 2025-02-17 LAB
ANION GAP SERPL CALCULATED.3IONS-SCNC: 12 MMOL/L (ref 7–15)
BASOPHILS # BLD AUTO: 0 10E3/UL (ref 0–0.2)
BASOPHILS NFR BLD AUTO: 0 %
BUN SERPL-MCNC: 13.8 MG/DL (ref 8–23)
CALCIUM SERPL-MCNC: 9.5 MG/DL (ref 8.8–10.4)
CHLORIDE SERPL-SCNC: 101 MMOL/L (ref 98–107)
CREAT SERPL-MCNC: 1.25 MG/DL (ref 0.67–1.17)
EGFRCR SERPLBLD CKD-EPI 2021: 64 ML/MIN/1.73M2
EOSINOPHIL # BLD AUTO: 0 10E3/UL (ref 0–0.7)
EOSINOPHIL NFR BLD AUTO: 0 %
ERYTHROCYTE [DISTWIDTH] IN BLOOD BY AUTOMATED COUNT: 13.4 % (ref 10–15)
GLUCOSE SERPL-MCNC: 110 MG/DL (ref 70–99)
HCO3 SERPL-SCNC: 24 MMOL/L (ref 22–29)
HCT VFR BLD AUTO: 43.8 % (ref 40–53)
HGB BLD-MCNC: 15.5 G/DL (ref 13.3–17.7)
IMM GRANULOCYTES # BLD: 0 10E3/UL
IMM GRANULOCYTES NFR BLD: 0 %
LYMPHOCYTES # BLD AUTO: 1.9 10E3/UL (ref 0.8–5.3)
LYMPHOCYTES NFR BLD AUTO: 17 %
MCH RBC QN AUTO: 31.6 PG (ref 26.5–33)
MCHC RBC AUTO-ENTMCNC: 35.4 G/DL (ref 31.5–36.5)
MCV RBC AUTO: 89 FL (ref 78–100)
MONOCYTES # BLD AUTO: 0.6 10E3/UL (ref 0–1.3)
MONOCYTES NFR BLD AUTO: 6 %
NEUTROPHILS # BLD AUTO: 8.5 10E3/UL (ref 1.6–8.3)
NEUTROPHILS NFR BLD AUTO: 76 %
NRBC # BLD AUTO: 0 10E3/UL
NRBC BLD AUTO-RTO: 0 /100
NT-PROBNP SERPL-MCNC: 118 PG/ML (ref 0–900)
PLATELET # BLD AUTO: 235 10E3/UL (ref 150–450)
POTASSIUM SERPL-SCNC: 3.7 MMOL/L (ref 3.4–5.3)
RBC # BLD AUTO: 4.91 10E6/UL (ref 4.4–5.9)
SODIUM SERPL-SCNC: 137 MMOL/L (ref 135–145)
TROPONIN T SERPL HS-MCNC: 14 NG/L
TROPONIN T SERPL HS-MCNC: 14 NG/L
WBC # BLD AUTO: 11.1 10E3/UL (ref 4–11)

## 2025-02-17 PROCEDURE — 85049 AUTOMATED PLATELET COUNT: CPT

## 2025-02-17 PROCEDURE — 84484 ASSAY OF TROPONIN QUANT: CPT

## 2025-02-17 PROCEDURE — 99285 EMERGENCY DEPT VISIT HI MDM: CPT | Mod: 25

## 2025-02-17 PROCEDURE — 83880 ASSAY OF NATRIURETIC PEPTIDE: CPT

## 2025-02-17 PROCEDURE — 93005 ELECTROCARDIOGRAM TRACING: CPT | Performed by: STUDENT IN AN ORGANIZED HEALTH CARE EDUCATION/TRAINING PROGRAM

## 2025-02-17 PROCEDURE — 96374 THER/PROPH/DIAG INJ IV PUSH: CPT

## 2025-02-17 PROCEDURE — 93005 ELECTROCARDIOGRAM TRACING: CPT | Performed by: EMERGENCY MEDICINE

## 2025-02-17 PROCEDURE — 36415 COLL VENOUS BLD VENIPUNCTURE: CPT

## 2025-02-17 PROCEDURE — 71046 X-RAY EXAM CHEST 2 VIEWS: CPT

## 2025-02-17 PROCEDURE — 96375 TX/PRO/DX INJ NEW DRUG ADDON: CPT

## 2025-02-17 PROCEDURE — 82565 ASSAY OF CREATININE: CPT

## 2025-02-17 PROCEDURE — 250N000011 HC RX IP 250 OP 636

## 2025-02-17 RX ORDER — DIPHENHYDRAMINE HYDROCHLORIDE 50 MG/ML
12.5 INJECTION INTRAMUSCULAR; INTRAVENOUS ONCE
Status: COMPLETED | OUTPATIENT
Start: 2025-02-17 | End: 2025-02-17

## 2025-02-17 RX ADMIN — FAMOTIDINE 20 MG: 10 INJECTION, SOLUTION INTRAVENOUS at 14:09

## 2025-02-17 RX ADMIN — DIPHENHYDRAMINE HYDROCHLORIDE 12.5 MG: 50 INJECTION, SOLUTION INTRAMUSCULAR; INTRAVENOUS at 14:08

## 2025-02-17 ASSESSMENT — ACTIVITIES OF DAILY LIVING (ADL)
ADLS_ACUITY_SCORE: 41

## 2025-02-17 ASSESSMENT — COLUMBIA-SUICIDE SEVERITY RATING SCALE - C-SSRS
1. IN THE PAST MONTH, HAVE YOU WISHED YOU WERE DEAD OR WISHED YOU COULD GO TO SLEEP AND NOT WAKE UP?: NO
2. HAVE YOU ACTUALLY HAD ANY THOUGHTS OF KILLING YOURSELF IN THE PAST MONTH?: NO
6. HAVE YOU EVER DONE ANYTHING, STARTED TO DO ANYTHING, OR PREPARED TO DO ANYTHING TO END YOUR LIFE?: NO

## 2025-02-17 NOTE — ED NOTES
"Pt laying in bed. Breathing even and unlabored. Pt endorses that his tongue still feels slightly swollen and throat feels \"tight\"   "

## 2025-02-17 NOTE — ED PROVIDER NOTES
Emergency Department Midlevel Supervisory Note     I had a face to face encounter with this patient seen by the Advanced Practice Provider ([facility]). I personally made/approved the management plan and take responsibility for the patient management. I personally saw patient and performed a substantive portion of the visit including all aspects of the medical decision making.     ED Course:  2:25 PM Radha Sarmiento PA-C staffed patient with me. I agree with their assessment and plan of management, and I will see the patient.  *** I met with the patient to introduce myself, gather additional history, perform my initial exam, and discuss the plan.     Brief HPI:     Josh Mark is a 64 year old male who presents for evaluation of shortness of breath.  Patient states that yesterday evening he was opening a container of preferred pads when he dropped it, spilling the liquid on his abdomen and legs.  Shortly after this states he began to feel slightly short of breath, like his chest was congested, he felt his tongue was swollen and dry.  Was able to swallow food/water normally.  Took Benadryl before bed which appeared to help his symptoms, able to sleep for today.  Today states he continues to have some feeling of chest congestion, shortness of breath, feels his tongue is still slightly swollen, his mouth is overall dry, but overall feels slightly better than yesterday.  Denies any known allergens, did not swallow/drink any of the fluid.  No history of asthma or COPD.  No chest pain, nausea or vomiting, abdominal pain.  Denies any rash accompanying the symptoms.     I, Terry Lynn, am serving as a scribe to document services personally performed by Dr. Eddie Palmer, based on my observations and the provider's statements to me. I, Dr. Eddie Palmer, attest that Terry Lynn is acting in a scribe capacity, has observed my performance of the services and has documented them in accordance with my direction.    Brief Physical  "Exam: BP (!) 171/98   Pulse 66   Temp 98.4  F (36.9  C) (Oral)   Resp 11   Ht 1.676 m (5' 6\")   Wt 124.7 kg (274 lb 14.4 oz)   SpO2 98%   BMI 44.37 kg/m    Constitutional:  Alert, in no acute distress  EYES: Conjunctivae clear  HENT:  Atraumatic  Respiratory:  Respirations even, unlabored, in no acute respiratory distress  Cardiovascular:  Regular rate and rhythm, good peripheral perfusion  GI: Soft, non-distended, non-tender  Musculoskeletal:  Moves all 4 extremities equally, grossly symmetrical strength  Integument: Warm & dry. No appreciable rash, erythema.  Neurologic:  Alert & oriented, speech clear and fluent, no focal deficits noted  Psych: Normal mood and affect       MDM:  ***       No diagnosis found.    Consults:  I discussed the patient with *** who recommends ***    Labs and Imaging:  Results for orders placed or performed during the hospital encounter of 02/17/25   CBC with platelets and differential   Result Value Ref Range    WBC Count 11.1 (H) 4.0 - 11.0 10e3/uL    RBC Count 4.91 4.40 - 5.90 10e6/uL    Hemoglobin 15.5 13.3 - 17.7 g/dL    Hematocrit 43.8 40.0 - 53.0 %    MCV 89 78 - 100 fL    MCH 31.6 26.5 - 33.0 pg    MCHC 35.4 31.5 - 36.5 g/dL    RDW 13.4 10.0 - 15.0 %    Platelet Count 235 150 - 450 10e3/uL    % Neutrophils 76 %    % Lymphocytes 17 %    % Monocytes 6 %    % Eosinophils 0 %    % Basophils 0 %    % Immature Granulocytes 0 %    NRBCs per 100 WBC 0 <1 /100    Absolute Neutrophils 8.5 (H) 1.6 - 8.3 10e3/uL    Absolute Lymphocytes 1.9 0.8 - 5.3 10e3/uL    Absolute Monocytes 0.6 0.0 - 1.3 10e3/uL    Absolute Eosinophils 0.0 0.0 - 0.7 10e3/uL    Absolute Basophils 0.0 0.0 - 0.2 10e3/uL    Absolute Immature Granulocytes 0.0 <=0.4 10e3/uL    Absolute NRBCs 0.0 10e3/uL       I have reviewed the relevant laboratory studies above.    I independently interpreted the following imaging study(s):   ***    EKG: I reviewed and independently interpreted the patient's EKG, with comments made as " listed below. Please see scanned EKG for full report.   ***    Procedures:  I was present for the key portions of procedures documented in [facility]/midlevel note, see midlevel note for further details.    Eddie Palmer MD  [facility] EMERGENCY DEPARTMENT  [address] [place] [phone] [phone]

## 2025-02-17 NOTE — DISCHARGE INSTRUCTIONS
Your lab work is overall normal.  Your chest x-ray is normal.  You had some improvement in your symptoms after medications, you can continue to take Benadryl at home as needed to help with your symptoms.  I highly recommend you follow-up with your primary care provider in 1 week for follow-up.    If you develop throat swelling, shortness of breath, difficulty breathing, unable to swallow your spit/water, worsening or any new symptoms, please return to the Emergency Department for evaluation.

## 2025-02-17 NOTE — ED PROVIDER NOTES
Emergency Department Encounter   NAME: Josh Mark ; AGE: 64 year old male ; YOB: 1960 ; MRN: [id number] ; PCP: Quirino Laureano   ED PROVIDER: Radha Malone PA-C    Evaluation Date & Time:   2/17/2025 12:05 PM    CHIEF COMPLAINT:  Shortness of Breath and Oral Swelling      Impression and Plan   MDM: Josh Mark is a 64 year old male who presents to the ED for evaluation of shortness of breath.  Patient states that yesterday evening he was opening a container of preferred pads when he dropped it, spilling the liquid on his abdomen and legs.  Shortly after this states he began to feel slightly short of breath, like his chest was congested, he felt his tongue was swollen and dry.  Was able to swallow food/water normally.  Took Benadryl before bed which appeared to help his symptoms, able to sleep for today.  Today states he continues to have some feeling of chest congestion, shortness of breath, feels his tongue is still slightly swollen, his mouth is overall dry, but overall feels slightly better than yesterday.  Denies any known allergens, did not swallow/drink any of the fluid.  No history of asthma or COPD.  No chest pain, nausea or vomiting, abdominal pain.  Denies any rash accompanying the symptoms.    Patient is hypertensive but otherwise vitally normal, no acute distress, SpO2 is 97%.  Sitting comfortably with head of bed elevated at without any signs of respiratory distress, talking in full sentences without difficulty. Physical exam is significant for normal  heart and lung sounds, no stridor, throat is moist with no signs of airway compromise.  No obvious swelling to the tongue or other structures in the mouth.  No cervical lymphadenopathy noted. Differential diagnosis includes allergic reaction, pneumonia, infiltrate, substance insulation, double reaction, ACS, pericarditis, cardiac arrhythmia.  No signs of anaphylaxis.  Will get chest x-ray, EKG, labs and treat symptomatically with  Benadryl and Pepcid.    I independently reviewed and interpreted all labs and imaging;  Labs show overall normal CBC and BMP.  BNP WNL.  Initial troponin 14, repeat same. EKG shows overall sinus rhythm with nonspecific T wave abnormality, no overall concerns. Chest x-ray is overall normal without signs of infiltrate.    On reevaluation, patient reports significant improvement after Benadryl and Pepcid.  Labs and imaging normal neurological signs of ACS, cardiac arrhythmia, pulmonary findings suggestive of cause of symptoms.  With improvement after medications, possibly mild allergic reaction.  We discussed following up with his primary care provider within the next week for continued management, continue take Benadryl at home as needed if he continues to have symptoms.  This point he continues to be hypertensive, which is normal for him, but this has improved throughout his stay, he is otherwise vitally normal, no acute distress and stable for discharge.    Return precautions to the ED were discussed, patient verbalized understanding and were agreeable with the plan. All questions were answered.     Per chart review:  -Called nurse triage line today for signs of respiratory distress and recommended to be seen in the ED  -Seen in August 2024 for stage IIIa kidney disease, overall physical health, manages his hypertension  - Recent labs and imaging reviewed  - Care everywhere reviewed    Medical Decision Making  Obtained supplemental history:Supplemental history obtained?: No  Reviewed external records: External records reviewed?: Documented in chart  Care impacted by chronic illness:Documented in Chart  Did you consider but not order tests?: Work up considered but not performed and documented in chart, if applicable  Did you interpret images independently?: Independent interpretation of ECG and images noted in documentation, when applicable.  Consultation discussion with other provider:Did you involve another  provider (consultant, , pharmacy, etc.)?: I discussed the care with another health care provider, see documentation for details.  Discharge. I prescribed additional prescription strength medication(s) as charted. I considered admission, but discharged patient after significant clinical improvement.    MIPS:  Not Applicable    ED COURSE:  1:04 PM I met and introduced myself to the patient. I gathered initial history and performed my physical exam. We discussed plan for initial workup.   2:25 PM I have staffed the patient with Dr. Palmer, ED MD, who has evaluated the patient and agrees with all aspects of today's care.   4:22 PM I rechecked the patient and discussed results, discharge, follow up, and reasons to return to the ED.       FINAL IMPRESSION:    ICD-10-CM    1. Dyspnea, unspecified type  R06.00             MEDICATIONS GIVEN IN THE EMERGENCY DEPARTMENT:  Medications   diphenhydrAMINE (BENADRYL) injection 12.5 mg (12.5 mg Intravenous $Given 2/17/25 1407)   famotidine (PEPCID) injection 20 mg (20 mg Intravenous $Given 2/17/25 9856)         NEW PRESCRIPTIONS STARTED AT TODAY'S ED VISIT:  Discharge Medication List as of 2/17/2025  4:25 PM            HPI   Use of Intrepreter: N/A     Josh Mark is a 64 year old male with a pertinent history of hypertension who presents to the E walk-in D by walk in for evaluation of shortness of breath.  Patient states that yesterday evening he was opening a container of preferred pads when he dropped it, spilling the liquid on his abdomen and legs.  Shortly after this states he began to feel slightly short of breath, like his chest was congested, he felt his tongue was swollen and dry.  Was able to swallow food/water normally.  Took Benadryl before bed which appeared to help his symptoms, able to sleep for today.  Today states he continues to have some feeling of chest congestion, shortness of breath, feels his tongue is still slightly swollen, his mouth is overall dry, but  "overall feels slightly better than yesterday.  Denies any known allergens, did not swallow/drink any of the fluid.  No history of asthma or COPD.  No chest pain, nausea or vomiting, abdominal pain.  Denies any rash accompanying the symptoms.       REVIEW OF SYSTEMS:  Pertinent positive and negative symptoms per HPI.       Medical History     No past medical history on file.    Past Surgical History:   Procedure Laterality Date    IR RENAL ANGIOGRAM BILATERAL  5/2/2000       No family history on file.    Social History     Tobacco Use    Smoking status: Never    Smokeless tobacco: Never   Substance Use Topics    Alcohol use: Never    Drug use: Never       amLODIPine (NORVASC) 10 MG tablet  aspirin (ASA) 81 MG EC tablet  atenolol (TENORMIN) 100 MG tablet  cloNIDine (CATAPRES) 0.2 MG tablet  hydrALAZINE (APRESOLINE) 50 MG tablet  losartan (COZAAR) 100 MG tablet  losartan (COZAAR) 50 MG tablet  MULTIVITAMIN (MULTIPLE VITAMIN ORAL)  simvastatin (ZOCOR) 40 MG tablet          Physical Exam     First Vitals:  Patient Vitals for the past 24 hrs:   BP Temp Temp src Pulse Resp SpO2 Height Weight   02/17/25 1545 (!) 145/89 -- -- 66 -- 95 % -- --   02/17/25 1530 (!) 141/88 -- -- 68 -- 95 % -- --   02/17/25 1431 123/77 -- -- 66 -- 96 % -- --   02/17/25 1421 133/83 -- -- 68 26 96 % -- --   02/17/25 1411 139/87 -- -- 70 15 96 % -- --   02/17/25 1341 (!) 176/107 -- -- 64 (!) 9 97 % -- --   02/17/25 1331 (!) 173/103 -- -- 66 14 98 % -- --   02/17/25 1259 (!) 171/98 -- -- 66 11 98 % -- --   02/17/25 1244 (!) 183/96 -- -- 66 15 97 % -- --   02/17/25 1230 (!) 172/79 -- -- 65 -- 99 % -- --   02/17/25 1156 (!) 171/100 98.4  F (36.9  C) Oral 74 18 97 % 1.676 m (5' 6\") 124.7 kg (274 lb 14.4 oz)         PHYSICAL EXAM:   Physical Exam  Constitutional:       General: He is not in acute distress.     Appearance: Normal appearance. He is not toxic-appearing.   HENT:      Head: Atraumatic.      Nose: Nose normal.      Mouth/Throat:      Mouth: " Mucous membranes are moist.      Pharynx: No pharyngeal swelling.   Eyes:      General: No scleral icterus.     Extraocular Movements: Extraocular movements intact.      Conjunctiva/sclera: Conjunctivae normal.      Pupils: Pupils are equal, round, and reactive to light.   Neck:      Thyroid: No thyromegaly.      Vascular: No JVD.   Cardiovascular:      Rate and Rhythm: Normal rate and regular rhythm.      Heart sounds: Normal heart sounds.   Pulmonary:      Effort: Pulmonary effort is normal. No respiratory distress.      Breath sounds: Normal breath sounds. No decreased breath sounds, wheezing, rhonchi or rales.   Chest:      Chest wall: No tenderness.   Abdominal:      Palpations: Abdomen is soft.      Tenderness: There is no abdominal tenderness.   Musculoskeletal:         General: No deformity. Normal range of motion.      Cervical back: Normal range of motion and neck supple.      Right lower leg: No tenderness. No edema.      Left lower leg: No tenderness. No edema.   Lymphadenopathy:      Cervical: No cervical adenopathy.   Skin:     General: Skin is warm.      Capillary Refill: Capillary refill takes less than 2 seconds.   Neurological:      General: No focal deficit present.      Mental Status: He is alert and oriented to person, place, and time.   Psychiatric:         Behavior: Behavior normal.             Results     LAB:  All pertinent labs reviewed and interpreted  Labs Ordered and Resulted from Time of ED Arrival to Time of ED Departure   BASIC METABOLIC PANEL - Abnormal       Result Value    Sodium 137      Potassium 3.7      Chloride 101      Carbon Dioxide (CO2) 24      Anion Gap 12      Urea Nitrogen 13.8      Creatinine 1.25 (*)     GFR Estimate 64      Calcium 9.5      Glucose 110 (*)    CBC WITH PLATELETS AND DIFFERENTIAL - Abnormal    WBC Count 11.1 (*)     RBC Count 4.91      Hemoglobin 15.5      Hematocrit 43.8      MCV 89      MCH 31.6      MCHC 35.4      RDW 13.4      Platelet Count 235       % Neutrophils 76      % Lymphocytes 17      % Monocytes 6      % Eosinophils 0      % Basophils 0      % Immature Granulocytes 0      NRBCs per 100 WBC 0      Absolute Neutrophils 8.5 (*)     Absolute Lymphocytes 1.9      Absolute Monocytes 0.6      Absolute Eosinophils 0.0      Absolute Basophils 0.0      Absolute Immature Granulocytes 0.0      Absolute NRBCs 0.0     NT PROBNP INPATIENT - Normal    N terminal Pro BNP Inpatient 118     TROPONIN T, HIGH SENSITIVITY - Normal    Troponin T, High Sensitivity 14     TROPONIN T, HIGH SENSITIVITY - Normal    Troponin T, High Sensitivity 14         RADIOLOGY:  XR Chest 2 Views   Final Result   IMPRESSION: Lungs are clear. No pleural effusion. Heart size and pulmonary vascularity within normal limits.            ECG:  Performed at: 1228    Impression: Sinus rhythm    Rate: 67  Rhythm: Sinus  Axis: 49  -24  32  KS Interval: 180  QRS Interval: 84  QTc Interval: 443  ST Changes: None  Comparison: 8/22/2024 nonspecific T wave abnormality    EKG results reviewed and interpreted by Dr. Palmer ED MD.     PROCEDURES:  None      Radha Malone PA-C   Emergency Medicine   [facility] EMERGENCY DEPARTMENT       Radha Malone PA-C  02/17/25 8951

## 2025-02-17 NOTE — ED NOTES
"Patient states tongue \"feels better than when I got here\" no resp distress noted. To radiology for CXR  "

## 2025-02-17 NOTE — TELEPHONE ENCOUNTER
Feels like tongue is swollen.    Tight around larynx    Nurse Triage SBAR    Is this a 2nd Level Triage? NO    Situation: patient reporting shortness of breath, tongue swelling, and neck tightness.    Background: States symptoms started yesterday 2/16/25 after opening a new box of unscented swiffer pads. States is unsure of other cause or reaction. Denies known allergies. States took COVID test which was negative. States Bp has been elevated today, at 10:10 was 154/92 with pulse 73 and is usaly 120-130. Denies missing any medication.    Assessment: States tongue feels swollen, touches the roof of his mouth. States larynx feels tight, like neck may be swollen. Reports shortness of breath at rest and while talking, though spoke in complete and clear sentences. States has pain on both sides of his neck but denies stiffness. Denies fever, rash, itching, cough, chest pain, and wheezing.     Protocol Recommended Disposition:   Go to ED Now, See More Appropriate Guideline    Recommendation: per protocol recommended patient be seen in ED, patient declined EMS. Instructed patient to call clinic if needs help with transportation. Patient agreeable to go to ED at this time.     Reason for Disposition   Tongue swelling is main symptom   MODERATE difficulty breathing (e.g., speaks in phrases, SOB even at rest, pulse 100-120) of new-onset or worse than normal   Wheezing, stridor, hoarseness, or difficulty breathing    Additional Information   Negative: SEVERE difficulty breathing (e.g., struggling for each breath, speaks in single words, pulse > 120)   Negative: Breathing stopped and hasn't returned   Negative: Choking on something   Negative: Bluish (or gray) lips or face   Negative: Difficult to awaken or acting confused (e.g., disoriented, slurred speech)   Negative: Passed out (e.g., fainted, lost consciousness, blacked out and was not responding)   Negative: Wheezing started suddenly after medicine, an allergic food, or bee  "sting   Negative: Stridor (harsh sound while breathing in)   Negative: Slow, shallow and weak breathing   Negative: Sounds like a life-threatening emergency to the triager   Negative: Chest pain   Negative: Wheezing (high pitched whistling sound) and previous asthma attacks or use of asthma medicines   Negative: Breathing difficulty and within 14 days of COVID-19 EXPOSURE (close contact) with someone diagnosed with COVID-19 (e.g., COVID test positive)   Negative: Breathing difficulty and COVID-19 is widespread in the community   Negative: Breathing diffculty and only present when coughing   Negative: Breathing difficulty and only from stuffy nose   Negative: Breathing diffculty and only from stuffy nose or runny nose from common cold   Negative: Life-threatening allergic reaction (anaphylaxis) suspected   Negative: Started suddenly after sting from bee, wasp, or yellow jacket   Negative: Started suddenly after taking a medicine or allergic food (e.g., nuts)    Answer Assessment - Initial Assessment Questions  1. RESPIRATORY STATUS: \"Describe your breathing?\" (e.g., wheezing, shortness of breath, unable to speak, severe coughing)       Short of breath at rest  2. ONSET: \"When did this breathing problem begin?\"       2/16/25 pm  3. PATTERN \"Does the difficult breathing come and go, or has it been constant since it started?\"       Pretty constant  4. SEVERITY: \"How bad is your breathing?\" (e.g., mild, moderate, severe)       moderate  5. RECURRENT SYMPTOM: \"Have you had difficulty breathing before?\" If Yes, ask: \"When was the last time?\" and \"What happened that time?\"       no  6. CARDIAC HISTORY: \"Do you have any history of heart disease?\" (e.g., heart attack, angina, bypass surgery, angioplasty)       HTN  7. LUNG HISTORY: \"Do you have any history of lung disease?\"  (e.g., pulmonary embolus, asthma, emphysema)      none  8. CAUSE: \"What do you think is causing the breathing problem?\"       Maybe reaction to new " "swiffer pad  9. OTHER SYMPTOMS: \"Do you have any other symptoms?\" (e.g., chest pain, cough, dizziness, fever, runny nose)      none  10. O2 SATURATION MONITOR:  \"Do you use an oxygen saturation monitor (pulse oximeter) at home?\" If Yes, ask: \"What is your reading (oxygen level) today?\" \"What is your usual oxygen saturation reading?\" (e.g., 95%)        no  11. PREGNANCY: \"Is there any chance you are pregnant?\" \"When was your last menstrual period?\"        NA  12. TRAVEL: \"Have you traveled out of the country in the last month?\" (e.g., travel history, exposures)        no    Answer Assessment - Initial Assessment Questions  1. ONSET: \"When did the swelling start?\" (e.g., minutes, hours, days)      2/16/25 PM  2. LOCATION: \"What part of the tongue is swollen?\"      water  3. SEVERITY: \"How swollen is it?\"      Touching the roof of mouth   4. CAUSE: \"What do you think is causing the tongue swelling?\" (e.g., history of angioedema, allergies)      Unsure, maybe reaction to new swiffer pads  5. RECURRENT SYMPTOM: \"Have you had tongue swelling before?\" If Yes, ask: \"When was the last time?\" \"What happened that time?\"      no  6. OTHER SYMPTOMS: \"Do you have any other symptoms?\" (e.g., breathing difficulty, facial swelling)      No solid food, able to swallow water  7. PREGNANCY: \"Is there any chance you are pregnant?\" \"When was your last menstrual period?\"      NA    Protocols used: Breathing Difficulty-A-OH, Allergic Reactions - Guideline [id number], Tongue Swelling-A-AH  Zaida Washburn RN  "

## 2025-02-17 NOTE — ED TRIAGE NOTES
Comes for evaluation of SOB and tongue swelling. Also stated feels swollen on sides of larynx. Started yesterday around 1300 hours after opening a Swifter box up and then 20-30 minutes later started having SOB and tongue swelling. Has been swallowing fluids without difficulty. Stated has not attempted to eat any food.      Triage Assessment (Adult)       Row Name 02/17/25 1153          Respiratory WDL    Respiratory WDL X  SOB        Skin Circulation/Temperature WDL    Skin Circulation/Temperature WDL WDL        Cardiac WDL    Cardiac WDL WDL        Peripheral/Neurovascular WDL    Peripheral Neurovascular WDL WDL        Cognitive/Neuro/Behavioral WDL    Cognitive/Neuro/Behavioral WDL WDL

## 2025-02-20 LAB
ATRIAL RATE - MUSE: 67 BPM
DIASTOLIC BLOOD PRESSURE - MUSE: 101 MMHG
INTERPRETATION ECG - MUSE: NORMAL
P AXIS - MUSE: 49 DEGREES
PR INTERVAL - MUSE: 180 MS
QRS DURATION - MUSE: 84 MS
QT - MUSE: 420 MS
QTC - MUSE: 443 MS
R AXIS - MUSE: -24 DEGREES
SYSTOLIC BLOOD PRESSURE - MUSE: 184 MMHG
T AXIS - MUSE: 32 DEGREES
VENTRICULAR RATE- MUSE: 67 BPM

## 2025-02-26 ENCOUNTER — OFFICE VISIT (OUTPATIENT)
Dept: FAMILY MEDICINE | Facility: CLINIC | Age: 65
End: 2025-02-26
Payer: MEDICARE

## 2025-02-26 VITALS
OXYGEN SATURATION: 97 % | HEIGHT: 66 IN | DIASTOLIC BLOOD PRESSURE: 82 MMHG | SYSTOLIC BLOOD PRESSURE: 135 MMHG | RESPIRATION RATE: 18 BRPM | HEART RATE: 71 BPM | WEIGHT: 268.4 LBS | BODY MASS INDEX: 43.13 KG/M2

## 2025-02-26 DIAGNOSIS — I10 PRIMARY HYPERTENSION: Primary | ICD-10-CM

## 2025-02-26 DIAGNOSIS — T78.40XS ALLERGIC REACTION, SEQUELA: ICD-10-CM

## 2025-02-26 PROCEDURE — 3079F DIAST BP 80-89 MM HG: CPT | Performed by: FAMILY MEDICINE

## 2025-02-26 PROCEDURE — 99213 OFFICE O/P EST LOW 20 MIN: CPT | Performed by: FAMILY MEDICINE

## 2025-02-26 PROCEDURE — 3075F SYST BP GE 130 - 139MM HG: CPT | Performed by: FAMILY MEDICINE

## 2025-02-26 NOTE — PROGRESS NOTES
"  Assessment & Plan     Primary hypertension  Pressure at goal range continue with current medications at this time    Allergic reaction, sequela  Follow-up from ER visit from suspected allergic reaction from inhalation  Possible defect in product but recommend avoidance of similar products in the future  Symptoms have resolved and no breathing issues at this time reviewed records from the ER with him today          BMI  Estimated body mass index is 43.32 kg/m  as calculated from the following:    Height as of this encounter: 1.676 m (5' 6\").    Weight as of this encounter: 121.7 kg (268 lb 6.4 oz).             Subjective   Josh is a 64 year old, presenting for the following health issues:  Hospital F/U ([facility] follow up- SOB. Things got better when he got to [facility]. Pt is still doing better every day. Still having issues with scents- allergies?? This was all triggered by swiffer mop smell. )        2/26/2025    10:25 AM   Additional Questions   Roomed by RADHA Fulton     Here for ER follow-up  Seen at the ER for breathing difficulty thought likely reaction to inhalation strong smells with a cleaning product.  Possible contributed by anxiety but patient presented with reason difficulty.  Workup essentially negative cardiac negative x-ray negative slight elevation of white blood cells but otherwise normal blood work.  Troponins negative x 2.  EKG normal.  Suspect inhalation of product fumes caused reaction symptoms have improved at this time.  Blood pressure markedly elevated at ER but now back to baseline at goal range continue with current medications.    Hospital Follow-up Visit:    Hospital/Nursing Home/IP Rehab Facility: [facility]  Date of Admission: 02/17/2025  Date of Discharge: 02/17/2025  Reason(s) for Admission: SOB  Was the patient in the ICU or did the patient experience delirium during hospitalization?  No  Do you have any other stressors you would like to " "discuss with your provider? No    Problems taking medications regularly:  None  Medication changes since discharge: None  Problems adhering to non-medication therapy:  None                           Objective    /82 (BP Location: Left arm, Patient Position: Sitting, Cuff Size: Adult Large)   Pulse 71   Resp 18   Ht 1.676 m (5' 6\")   Wt 121.7 kg (268 lb 6.4 oz)   SpO2 97%   BMI 43.32 kg/m    Body mass index is 43.32 kg/m .  Physical Exam   GENERAL: alert and no distress  EYES: Eyes grossly normal to inspection, PERRL and conjunctivae and sclerae normal  RESP: lungs clear to auscultation - no rales, rhonchi or wheezes  CV: regular rate and rhythm, normal S1 S2, no S3 or S4, no murmur, click or rub, no peripheral edema  MS: no gross musculoskeletal defects noted, no edema  PSYCH: mentation appears normal, affect normal/bright            Signed Electronically by: Quirino Laureano MD    "

## 2025-03-10 ENCOUNTER — NURSE TRIAGE (OUTPATIENT)
Dept: FAMILY MEDICINE | Facility: CLINIC | Age: 65
End: 2025-03-10
Payer: MEDICARE

## 2025-03-10 NOTE — TELEPHONE ENCOUNTER
"Nurse Triage SBAR    Is this a 2nd Level Triage? NO    Situation: Patient calling to report elevated bp    Background: Patient was in the ED on 2/17 and had follow up on 2/26, patient had elevated bP and difficulty breathing     Assessment: He currently has oxygen saturation of 95%, however, he states he feels slight difficulty breathing. Patient is talking normal on phone, no sign needing to take breaths between words. His BP is 157/88 on the phone. Patient has NO headache, NO chest pain, NO pounding heart, NO lightheaded/dizziness, NO blurred vision.     Protocol Recommended Disposition:   No disposition on file.    Recommendation: RN instructed patient when to report to the ED, such as Chest pain, difficulty breathing with low O2 sat (patient takes it continually), severe headache, lightheaded/dizziness, BP over 180 and symptomatic, or pounding heart beat. Patient agreed. However, patient is scheduled with PCP for Wednesday 3/12.       Reason for Disposition   Systolic BP >= 130 OR Diastolic >= 80, and is taking BP medications    Answer Assessment - Initial Assessment Questions  1. BLOOD PRESSURE: \"What is your blood pressure?\" \"Did you take at least two measurements 5 minutes apart?\"      157/88  2. ONSET: \"When did you take your blood pressure?\"      now  3. HOW: \"How did you take your blood pressure?\" (e.g., automatic home BP monitor, visiting nurse)      machine  4. HISTORY: \"Do you have a history of high blood pressure?\"      Yes  5. MEDICINES: \"Are you taking any medicines for blood pressure?\" \"Have you missed any doses recently?\"      Amlodipine, atenolol, hydralazine, losartan   6. OTHER SYMPTOMS: \"Do you have any symptoms?\" (e.g., blurred vision, chest pain, difficulty breathing, headache, weakness)      None   7. PREGNANCY: \"Is there any chance you are pregnant?\" \"When was your last menstrual period?\"      *No Answer*    Protocols used: Blood Pressure - High-A-OH    "

## 2025-03-12 ENCOUNTER — OFFICE VISIT (OUTPATIENT)
Dept: FAMILY MEDICINE | Facility: CLINIC | Age: 65
End: 2025-03-12
Payer: MEDICARE

## 2025-03-12 VITALS
SYSTOLIC BLOOD PRESSURE: 158 MMHG | OXYGEN SATURATION: 98 % | WEIGHT: 253.2 LBS | RESPIRATION RATE: 10 BRPM | BODY MASS INDEX: 39.74 KG/M2 | DIASTOLIC BLOOD PRESSURE: 98 MMHG | TEMPERATURE: 97.7 F | HEIGHT: 67 IN | HEART RATE: 58 BPM

## 2025-03-12 DIAGNOSIS — R00.2 PALPITATIONS: Primary | ICD-10-CM

## 2025-03-12 DIAGNOSIS — I10 PRIMARY HYPERTENSION: ICD-10-CM

## 2025-03-12 PROCEDURE — 3077F SYST BP >= 140 MM HG: CPT | Performed by: FAMILY MEDICINE

## 2025-03-12 PROCEDURE — 3080F DIAST BP >= 90 MM HG: CPT | Performed by: FAMILY MEDICINE

## 2025-03-12 PROCEDURE — 93242 EXT ECG>48HR<7D RECORDING: CPT | Performed by: FAMILY MEDICINE

## 2025-03-12 PROCEDURE — 99214 OFFICE O/P EST MOD 30 MIN: CPT | Performed by: FAMILY MEDICINE

## 2025-03-12 NOTE — PROGRESS NOTES
Josh Mark arrived here on 3/12/2025 4:05 PM for 3-7 Days  Zio monitor placement per ordering provider Dr. Laureano for the diagnosis palpitations.  Patient s skin was prepped per protocol. Dr. Laureano is the supervising MD.  Zio monitor was placed.  Instructions were reviewed with and given to the patient.  Patient verbalized understanding of wear, troubleshooting and monitor return instructions.  Melinda Valentino RN

## 2025-03-12 NOTE — PROGRESS NOTES
"  Assessment & Plan     Palpitations  Actuating blood pressure and palpitations abnormal chest sensation noted as quick in duration and irregular in frequency  Normal rhythm in clinic today blood pressure is elevated where last week blood pressure was normal  Discussed further evaluation with a cardiac monitor which he is in agreement  Blood pressure running elevated over the next few days to contact me at clinic and we will increase clonidine-to note when this was done in the past he had hypotension  - ZIO PATCH 3-7 DAYS (additional cost to patient)  - ZIO PATCH 3-7 DAYS APPLICATION    Primary hypertension  Patient's blood pressure been slightly running elevated and now on the last couple days it started noting some palpitations or skipped beats  Is normal sinus rhythm here in clinic today discussed with him the possibility of these elevation of blood pressure that has been happening the last month could be correlated possibly with arrhythmia  Will further evaluate the cardiac monitor  Discussed his blood pressure elevated today but normal last week  If running elevated over the next couple days will increase clonidine to 0.3 twice daily but to note this was done in the past and he had hypotension            BMI  Estimated body mass index is 39.93 kg/m  as calculated from the following:    Height as of this encounter: 1.696 m (5' 6.77\").    Weight as of this encounter: 114.9 kg (253 lb 3.2 oz).             Scott Moreno is a 64 year old, presenting for the following health issues:  Hypertension (Pt reported a heartbeat skip last night, felt electric shock going through chest, blood pressure has been high for the past 10 days)        3/12/2025     3:20 PM   Additional Questions   Roomed by Marleni GERMAN CMA   Accompanied by self     History of Present Illness       Reason for visit:  Blood pressure  Symptom onset:  3-7 days ago  Symptom intensity:  Mild  Symptom progression:  Staying the same  Had these symptoms " "before:  Yes  Has tried/received treatment for these symptoms:  Yes  Previous treatment was successful:  Yes  Prior treatment description:  [facility]   He is taking medications regularly.      Patient here for evaluation of elevated blood pressure and now palpitations and skipped beats.  Made him appointment for follow-up noting that his blood pressure running slightly elevated and started having some infrequent episodes that are quick in duration of some palpitations and possible skipped beats.  Seen at the emergency room for some chest symptoms a few weeks ago her blood pressure was elevated but workup was otherwise negative you follow-up with me in the clinic and blood pressure had been normalized but now these episodes are occurring again.  Concerning for possible arrhythmia coming and going discussed with patient further evaluation with all cardiac monitor.  Blood pressure fluctuations could be due to on and off with arrhythmia.  He will monitor and we can adjust clonidine per above if running consistently elevated.  Have the patient wear a 7-day monitor and follow-up based on results.                Objective    BP (!) 158/98   Pulse 58   Temp 97.7  F (36.5  C) (Oral)   Resp 10   Ht 1.696 m (5' 6.77\")   Wt 114.9 kg (253 lb 3.2 oz)   SpO2 98%   BMI 39.93 kg/m    Body mass index is 39.93 kg/m .  Physical Exam   GENERAL: alert and no distress  RESP: lungs clear to auscultation - no rales, rhonchi or wheezes  CV: regular rate and rhythm, normal S1 S2, no S3 or S4, no murmur, click or rub, no peripheral edema  MS: no gross musculoskeletal defects noted, no edema  PSYCH: mentation appears normal, affect normal/bright            Signed Electronically by: Quirino Laureano MD    "

## 2025-04-02 ENCOUNTER — TELEPHONE (OUTPATIENT)
Dept: FAMILY MEDICINE | Facility: CLINIC | Age: 65
End: 2025-04-02
Payer: MEDICARE

## 2025-04-02 NOTE — TELEPHONE ENCOUNTER
Reached out to patient to discuss.  Discussed ZIO results. Patient reports that he has had some occasional palpitations but nothing of major concern.  He is feeling well and glad to hear there were no concerns.    Judah Hood RN     [facility]      ----- Message from Judah MANUEL sent at 4/2/2025  1:15 PM CDT -----      ----- Message -----  From: Quirino Laureano MD  Sent: 3/31/2025   7:52 PM CDT  To: [facility] Pool    Please inform patient that cardiac monitor showing no concerns on evaluation that need further evaluation- has he had ongoing symptoms?

## 2025-05-22 ENCOUNTER — TELEPHONE (OUTPATIENT)
Dept: OPHTHALMOLOGY | Facility: CLINIC | Age: 65
End: 2025-05-22
Payer: MEDICARE

## 2025-05-22 NOTE — TELEPHONE ENCOUNTER
Health Call Center    Phone Message    May a detailed message be left on voicemail: yes     Reason for Call: Other: Josh called because over the weekend he was seen at the hospital for double vision and they want him to follow up with an ophthalmologist. Please call Josh at [phone]. Per protocol writer is to send a TE. Thank you      Action Taken: Message routed to:  [facility] Eye    Travel Screening: Not Applicable     Date of Service:

## 2025-05-22 NOTE — TELEPHONE ENCOUNTER
M: [phone] H: [phone] W: [phone]     Called mobile times two and left message with direct number at [phone]    Called home number and left message with direct number at [phone]    Alistair Crane RN 10:44 AM 05/22/25

## 2025-06-03 ENCOUNTER — OFFICE VISIT (OUTPATIENT)
Dept: FAMILY MEDICINE | Facility: CLINIC | Age: 65
End: 2025-06-03
Payer: MEDICARE

## 2025-06-03 VITALS
WEIGHT: 262 LBS | SYSTOLIC BLOOD PRESSURE: 135 MMHG | BODY MASS INDEX: 41.12 KG/M2 | RESPIRATION RATE: 16 BRPM | HEART RATE: 61 BPM | OXYGEN SATURATION: 97 % | TEMPERATURE: 99 F | HEIGHT: 67 IN | DIASTOLIC BLOOD PRESSURE: 78 MMHG

## 2025-06-03 DIAGNOSIS — G43.109 OCULAR MIGRAINE: Primary | ICD-10-CM

## 2025-06-03 PROCEDURE — 3075F SYST BP GE 130 - 139MM HG: CPT | Performed by: FAMILY MEDICINE

## 2025-06-03 PROCEDURE — G2211 COMPLEX E/M VISIT ADD ON: HCPCS | Performed by: FAMILY MEDICINE

## 2025-06-03 PROCEDURE — 3078F DIAST BP <80 MM HG: CPT | Performed by: FAMILY MEDICINE

## 2025-06-03 PROCEDURE — 99213 OFFICE O/P EST LOW 20 MIN: CPT | Performed by: FAMILY MEDICINE

## 2025-06-03 RX ORDER — SUMATRIPTAN SUCCINATE 25 MG/1
25 TABLET ORAL
Qty: 10 TABLET | Refills: 0 | Status: SHIPPED | OUTPATIENT
Start: 2025-06-03

## 2025-06-03 NOTE — PROGRESS NOTES
"  Assessment & Plan     Ocular migraine  Patient following up from an ER visit in [facility]  Was seen for double vision  Patient thinks a smell may be triggered a migraine he was waiting to eat and suddenly had double vision  Brought in CT of the head was normal as blood pressure was lowering back to normal range patient symptoms were resolving  Distant history of migraines in the past was not had to deal with them in some time recent history with some chemical smells leading to shortness of breath and now visual changes possible new onset of migraines  Discussed with him having a trial of Imitrex on hand in case another ocular migraine order occur  Follow-up with ophthalmology and they said no abnormalities also suggestive of ocular migraine  - SUMAtriptan (IMITREX) 25 MG tablet  Dispense: 10 tablet; Refill: 0            BMI  Estimated body mass index is 41.32 kg/m  as calculated from the following:    Height as of this encounter: 1.696 m (5' 6.77\").    Weight as of this encounter: 118.8 kg (262 lb).             Scott Moreno is a 64 year old, presenting for the following health issues:  ER F/U        6/3/2025     9:45 AM   Additional Questions   Roomed by Eliana BAREU CMA   Accompanied by Self     HPI    Here for ER follow-up from [facility] for double vision.  Patient received imaging and had improvement of symptoms his blood pressure was normalizing blood pressure was markedly elevated on arrival and as it improved his symptoms dissipated.  No acute findings on examination and review of records.  He was followed with ophthalmology who also saw real no acute abnormalities to explain the double vision.  Patient has not had symptoms since and we discussed with him the possibility of an ocular migraine even though he did not have a headache.  We discussed having a triptan on hand for possible next event as he has maybe had some recent smells in the last few months that have led " "to some these abnormal symptoms.  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  Patient is agreement to a trial of sumatriptan.      ED/UC Followup:    Facility:  [facility]  Date of visit: 5/18/25  Reason for visit: Double vision  Current Status: Haven't had any problems since then          Objective    /78 (BP Location: Left arm, Patient Position: Sitting, Cuff Size: Adult Regular)   Pulse 61   Temp 99  F (37.2  C) (Oral)   Resp 16   Ht 1.696 m (5' 6.77\")   Wt 118.8 kg (262 lb)   SpO2 97%   BMI 41.32 kg/m    Body mass index is 41.32 kg/m .  Physical Exam   GENERAL: alert and no distress  EYES: Eyes grossly normal to inspection, PERRL and conjunctivae and sclerae normal  RESP: lungs clear to auscultation - no rales, rhonchi or wheezes  CV: regular rate and rhythm, normal S1 S2, no S3 or S4, no murmur, click or rub, no peripheral edema  MS: no gross musculoskeletal defects noted, no edema  NEURO: Normal strength and tone, mentation intact and speech normal  PSYCH: mentation appears normal, affect normal/bright          The longitudinal plan of care for the diagnosis(es)/condition(s) as documented were addressed during this visit. Due to the added complexity in care, I will continue to support Josh in the subsequent management and with ongoing continuity of care.  Signed Electronically by: Quirino Laureano MD    "

## 2025-07-23 ENCOUNTER — PATIENT OUTREACH (OUTPATIENT)
Dept: CARE COORDINATION | Facility: CLINIC | Age: 65
End: 2025-07-23
Payer: MEDICARE

## 2025-09-03 ENCOUNTER — OFFICE VISIT (OUTPATIENT)
Dept: FAMILY MEDICINE | Facility: CLINIC | Age: 65
End: 2025-09-03
Payer: MEDICARE

## 2025-09-03 VITALS
TEMPERATURE: 99.2 F | HEIGHT: 68 IN | HEART RATE: 65 BPM | SYSTOLIC BLOOD PRESSURE: 128 MMHG | OXYGEN SATURATION: 98 % | RESPIRATION RATE: 16 BRPM | WEIGHT: 258.5 LBS | DIASTOLIC BLOOD PRESSURE: 77 MMHG | BODY MASS INDEX: 39.18 KG/M2

## 2025-09-03 DIAGNOSIS — E66.812 CLASS 2 SEVERE OBESITY DUE TO EXCESS CALORIES WITH SERIOUS COMORBIDITY AND BODY MASS INDEX (BMI) OF 39.0 TO 39.9 IN ADULT (H): ICD-10-CM

## 2025-09-03 DIAGNOSIS — G43.109 OCULAR MIGRAINE: ICD-10-CM

## 2025-09-03 DIAGNOSIS — Z00.00 HEALTH CARE MAINTENANCE: Primary | ICD-10-CM

## 2025-09-03 DIAGNOSIS — I10 ESSENTIAL HYPERTENSION: ICD-10-CM

## 2025-09-03 DIAGNOSIS — Z12.5 SCREENING FOR PROSTATE CANCER: ICD-10-CM

## 2025-09-03 DIAGNOSIS — E78.2 MIXED HYPERLIPIDEMIA: ICD-10-CM

## 2025-09-03 DIAGNOSIS — E66.01 CLASS 2 SEVERE OBESITY DUE TO EXCESS CALORIES WITH SERIOUS COMORBIDITY AND BODY MASS INDEX (BMI) OF 39.0 TO 39.9 IN ADULT (H): ICD-10-CM

## 2025-09-03 DIAGNOSIS — N18.31 STAGE 3A CHRONIC KIDNEY DISEASE (H): ICD-10-CM

## 2025-09-03 LAB
ALBUMIN SERPL BCG-MCNC: 4 G/DL (ref 3.5–5.2)
ALP SERPL-CCNC: 66 U/L (ref 40–150)
ALT SERPL W P-5'-P-CCNC: 27 U/L (ref 0–70)
ANION GAP SERPL CALCULATED.3IONS-SCNC: 14 MMOL/L (ref 7–15)
AST SERPL W P-5'-P-CCNC: 36 U/L (ref 0–45)
BILIRUB SERPL-MCNC: 1 MG/DL
BUN SERPL-MCNC: 17.3 MG/DL (ref 8–23)
CALCIUM SERPL-MCNC: 9.8 MG/DL (ref 8.8–10.4)
CHLORIDE SERPL-SCNC: 103 MMOL/L (ref 98–107)
CHOLEST SERPL-MCNC: 163 MG/DL
CREAT SERPL-MCNC: 1.52 MG/DL (ref 0.67–1.17)
CREAT UR-MCNC: 163 MG/DL
EGFRCR SERPLBLD CKD-EPI 2021: 50 ML/MIN/1.73M2
ERYTHROCYTE [DISTWIDTH] IN BLOOD BY AUTOMATED COUNT: 13.3 % (ref 10–15)
FASTING STATUS PATIENT QL REPORTED: YES
FASTING STATUS PATIENT QL REPORTED: YES
GLUCOSE SERPL-MCNC: 111 MG/DL (ref 70–99)
HCO3 SERPL-SCNC: 22 MMOL/L (ref 22–29)
HCT VFR BLD AUTO: 46.4 % (ref 40–53)
HDLC SERPL-MCNC: 37 MG/DL
HGB BLD-MCNC: 15.9 G/DL (ref 13.3–17.7)
LDLC SERPL CALC-MCNC: 87 MG/DL
MCH RBC QN AUTO: 30.8 PG (ref 26.5–33)
MCHC RBC AUTO-ENTMCNC: 34.3 G/DL (ref 31.5–36.5)
MCV RBC AUTO: 89.9 FL (ref 78–100)
MICROALBUMIN UR-MCNC: 49 MG/L
MICROALBUMIN/CREAT UR: 30.06 MG/G CR (ref 0–17)
NONHDLC SERPL-MCNC: 126 MG/DL
PLATELET # BLD AUTO: 216 10E3/UL (ref 150–450)
POTASSIUM SERPL-SCNC: 4.4 MMOL/L (ref 3.4–5.3)
PROT SERPL-MCNC: 7.9 G/DL (ref 6.4–8.3)
PSA SERPL DL<=0.01 NG/ML-MCNC: 6.03 NG/ML (ref 0–4.5)
RBC # BLD AUTO: 5.16 10E6/UL (ref 4.4–5.9)
SODIUM SERPL-SCNC: 139 MMOL/L (ref 135–145)
TRIGL SERPL-MCNC: 194 MG/DL
WBC # BLD AUTO: 9.06 10E3/UL (ref 4–11)

## 2025-09-03 RX ORDER — SIMVASTATIN 40 MG
40 TABLET ORAL AT BEDTIME
Qty: 90 TABLET | Refills: 3 | Status: SHIPPED | OUTPATIENT
Start: 2025-09-03

## 2025-09-03 RX ORDER — LOSARTAN POTASSIUM 100 MG/1
100 TABLET ORAL DAILY
Qty: 90 TABLET | Refills: 3 | Status: SHIPPED | OUTPATIENT
Start: 2025-09-03

## 2025-09-03 RX ORDER — AMLODIPINE BESYLATE 10 MG/1
10 TABLET ORAL DAILY
Qty: 90 TABLET | Refills: 3 | Status: SHIPPED | OUTPATIENT
Start: 2025-09-03

## 2025-09-03 RX ORDER — LOSARTAN POTASSIUM 50 MG/1
50 TABLET ORAL DAILY
Qty: 90 TABLET | Refills: 3 | Status: SHIPPED | OUTPATIENT
Start: 2025-09-03

## 2025-09-03 RX ORDER — ATENOLOL 100 MG/1
100 TABLET ORAL DAILY
Qty: 90 TABLET | Refills: 3 | Status: SHIPPED | OUTPATIENT
Start: 2025-09-03

## 2025-09-03 RX ORDER — CLONIDINE HYDROCHLORIDE 0.2 MG/1
0.2 TABLET ORAL 2 TIMES DAILY
Qty: 180 TABLET | Refills: 3 | Status: SHIPPED | OUTPATIENT
Start: 2025-09-03

## 2025-09-03 RX ORDER — HYDRALAZINE HYDROCHLORIDE 50 MG/1
TABLET, FILM COATED ORAL
Qty: 180 TABLET | Refills: 3 | Status: SHIPPED | OUTPATIENT
Start: 2025-09-03

## 2025-09-03 SDOH — HEALTH STABILITY: PHYSICAL HEALTH: ON AVERAGE, HOW MANY DAYS PER WEEK DO YOU ENGAGE IN MODERATE TO STRENUOUS EXERCISE (LIKE A BRISK WALK)?: 3 DAYS
